# Patient Record
Sex: FEMALE | Race: WHITE | NOT HISPANIC OR LATINO | Employment: OTHER | URBAN - METROPOLITAN AREA
[De-identification: names, ages, dates, MRNs, and addresses within clinical notes are randomized per-mention and may not be internally consistent; named-entity substitution may affect disease eponyms.]

---

## 2018-06-08 ENCOUNTER — HOSPITAL ENCOUNTER (EMERGENCY)
Facility: CLINIC | Age: 38
Discharge: HOME | End: 2018-06-08
Attending: EMERGENCY MEDICINE | Admitting: EMERGENCY MEDICINE
Payer: COMMERCIAL

## 2018-06-08 ENCOUNTER — APPOINTMENT (OUTPATIENT)
Dept: GENERAL RADIOLOGY | Facility: CLINIC | Age: 38
End: 2018-06-08
Attending: EMERGENCY MEDICINE
Payer: COMMERCIAL

## 2018-06-08 VITALS
DIASTOLIC BLOOD PRESSURE: 87 MMHG | HEIGHT: 66 IN | BODY MASS INDEX: 23.78 KG/M2 | RESPIRATION RATE: 18 BRPM | SYSTOLIC BLOOD PRESSURE: 109 MMHG | TEMPERATURE: 98.3 F | OXYGEN SATURATION: 97 % | WEIGHT: 148 LBS

## 2018-06-08 DIAGNOSIS — M79.672 LEFT FOOT PAIN: ICD-10-CM

## 2018-06-08 PROCEDURE — 99283 EMERGENCY DEPT VISIT LOW MDM: CPT

## 2018-06-08 PROCEDURE — 73630 X-RAY EXAM OF FOOT: CPT | Mod: LT

## 2018-06-08 ASSESSMENT — ENCOUNTER SYMPTOMS: NUMBNESS: 0

## 2018-06-08 NOTE — ED AVS SNAPSHOT
[facility] Emergency Department    [address]    [facility] [address]    Phone:  [phone]    Fax:  [phone]                                       Soledad Botello   MRN: [id number]    Department:  [facility] Emergency Department   Date of Visit:  6/8/2018           After Visit Summary Signature Page     I have received my discharge instructions, and my questions have been answered. I have discussed any challenges I see with this plan with the nurse or doctor.    ..........................................................................................................................................  Patient/Patient Representative Signature      ..........................................................................................................................................  Patient Representative Print Name and Relationship to Patient    ..................................................               ................................................  Date                                            Time    ..........................................................................................................................................  Reviewed by Signature/Title    ...................................................              ..............................................  Date                                                            Time

## 2018-06-08 NOTE — ED AVS SNAPSHOT
[facility] Emergency Department    [address]    [facility] [address]    Phone:  [phone]    Fax:  [phone]                                       Soledad Botello   MRN: [id number]    Department:  [facility] Emergency Department   Date of Visit:  6/8/2018           Patient Information     Date Of Birth          1980        Your diagnoses for this visit were:     Left foot pain        You were seen by Ailin Ramirez MD.      Follow-up Information     Follow up with [facility] Emergency Department.    Specialty:  EMERGENCY MEDICINE    Why:  If symptoms worsen    Contact information:    [address]  [facility] [address] [phone]        Discharge Instructions       Ice the area and elevate the leg  Use tylenol and ibuprofen for pain    Discharge Instructions  Extremity Injury    You were seen today for an injury to an extremity (arm, hand, leg, or foot). You may have a bruise, strain, or fracture (broken bone).    Generally, every Emergency Department visit should have a follow-up clinic visit with either a primary or a specialty clinic/provider. Please follow-up as instructed by your emergency provider today.  Return to the Emergency Department right away if:    Your pain seems to change or get worse or there is pain in a new area that wasn t evaluated today.    Your extremity becomes pale, cool, blue, or numb or tingling past the injury.    You have more drainage, redness or pain in the area of the cut or abrasion.    You have pain that you cannot control with the medicine recommended or prescribed here, or you have pain that seems too much for your injury.    Your child (who is injured) will not stop crying or is much more fussy than normal.    You have new symptoms or anything that worries you.    What to Expect:    Your swelling and pain may be worse the day after your injury, but should not be severe and should  start getting better after that. You should not have new symptoms and your pain should not get worse.    You may start to get a bruise over the injured area or below the injured area (bruising can follow gravity).    Your movement and strength should get better with time.    Some injuries may not show up until after you have left the Emergency Department so it is important to follow-up as directed.    Your injury may prevent you from working.  Follow-up with your regular provider to get a work release note.    Pain medications or your injury may make it unsafe to drive or operate machinery.    Home Care:    RICE: Rest, Ice, Compression, Elevation  o Rest: Rest your injured area for at least 1-2 days. After that you may start using your extremity again as long as there is not too much pain.   o Ice: Apply ice your injured area for 15 minutes at a time, at least 3 times a day. Use a cloth between the ice bag and your skin to prevent frostbite. Do not sleep with an ice pack or heating pad on, since this can cause burns or skin injury.  o Compression: You may use an elastic bandage (Ace  Wrap) if it makes you more comfortable. Wrap it just tight enough to provide light compression, like a new pair of socks feels. Loosen the bandage if you have swelling past the bandage.  o Elevation: Raise the injured area above the level of your heart as much as possible in the first 1-2 days.      Use Tylenol  (acetaminophen), Motrin (ibuprofen), or Advil  (ibuprofen) for your pain unless you have an allergy or are told not to use these medications by your provider.  Take the medications as instructed on the package. Tylenol  (acetaminophen) is in many prescription medicines and non-prescription medicines--check all of your medicines to be sure you aren t taking more than 3000 mg per day.    Please follow any other instructions that were discussed with you by your provider.    Stretching/Exercises:  You may have been provided with  instructions for stretching or exercises. If your injury was to your arm or shoulder and your provider put you in a sling or an immobilizer, it is important that you take off your immobilizer within 3 days and stretch/move your shoulder, unless your provider specifically tells you to not move your shoulder.  This is to prevent further injury such as a  frozen shoulder .     If you were given a prescription for medicine here today, be sure to read all of the information (including the package insert) that comes with your prescription.  This will include important information about the medicine, its side effects, and any warnings that you need to know about.  The pharmacist who fills the prescription can provide more information and answer questions you may have about the medicine.  If you have questions or concerns that the pharmacist cannot address, please call or return to the Emergency Department.     Remember that you can always come back to the Emergency Department if you are not able to see your regular provider in the amount of time listed above, if you get any new symptoms, or if there is anything that worries you.      24 Hour Appointment Hotline       To make an appointment at any [facility], call [phone] ([phone]). If you don't have a family doctor or clinic, we will help you find one. [facility] are conveniently located to serve the needs of you and your family.             Review of your medicines      Our records show that you are taking the medicines listed below. If these are incorrect, please call your family doctor or clinic.        Dose / Directions Last dose taken    ADVIL PO        Refills:  0                Procedures and tests performed during your visit     Foot XR, G/E 3 views, left      Orders Needing Specimen Collection     None      Pending Results     Date and Time Order Name Status Description    6/8/2018 5507 Foot XR, G/E 3 views, left Preliminary              Pending Culture Results     No orders found from 6/6/2018 to 6/9/2018.            Pending Results Instructions     If you had any lab results that were not finalized at the time of your Discharge, you can call the ED Lab Result RN at [phone]. You will be contacted by this team for any positive Lab results or changes in treatment. The nurses are available 7 days a week from 10A to 6:30P.  You can leave a message 24 hours per day and they will return your call.        Test Results From Your Hospital Stay        6/8/2018 11:03 PM      Narrative     LEFT FOOT THREE OR MORE VIEWS   6/8/2018 10:58 PM     HISTORY: Left foot pain at medial side near calcaneous.    COMPARISON: None.        Impression     IMPRESSION: Normal.                Clinical Quality Measure: Blood Pressure Screening     Your blood pressure was checked while you were in the emergency department today. The last reading we obtained was  BP: 108/79 . Please read the guidelines below about what these numbers mean and what you should do about them.  If your systolic blood pressure (the top number) is less than 120 and your diastolic blood pressure (the bottom number) is less than 80, then your blood pressure is normal. There is nothing more that you need to do about it.  If your systolic blood pressure (the top number) is 120-139 or your diastolic blood pressure (the bottom number) is 80-89, your blood pressure may be higher than it should be. You should have your blood pressure rechecked within a year by a primary care provider.  If your systolic blood pressure (the top number) is 140 or greater or your diastolic blood pressure (the bottom number) is 90 or greater, you may have high blood pressure. High blood pressure is treatable, but if left untreated over time it can put you at risk for heart attack, stroke, or kidney failure. You should have your blood pressure rechecked by a primary care provider within the next 4 weeks.  If your provider in the  "emergency department today gave you specific instructions to follow-up with your doctor or provider even sooner than that, you should follow that instruction and not wait for up to 4 weeks for your follow-up visit.        Thank you for choosing [facility]       Thank you for choosing [facility] for your care. Our goal is always to provide you with excellent care. Hearing back from our patients is one way we can continue to improve our services. Please take a few minutes to complete the written survey that you may receive in the mail after you visit with us. Thank you!        OOgaveharmakerSQR Information     Countdown lets you send messages to your doctor, view your test results, renew your prescriptions, schedule appointments and more. To sign up, go to [email or web address] . Click on \"Log in\" on the left side of the screen, which will take you to the Welcome page. Then click on \"Sign up Now\" on the right side of the page.     You will be asked to enter the access code listed below, as well as some personal information. Please follow the directions to create your username and password.     Your access code is: [email or web address]  Expires: 2018 11:15 PM     Your access code will  in 90 days. If you need help or a new code, please call your [facility] clinic or [phone].        Care EveryWhere ID     This is your Care EveryWhere ID. This could be used by other organizations to access your [facility] medical records  [id number]        Equal Access to Services     MARIA ELENA JUAN : Hadii tish del rioo Soadrian, waaxda luqadaha, qaybta kaalmada maggieyada, jackie enrique . So [facility] [phone].    ATENCIÓN: Si habla español, tiene a regalado disposición servicios gratuitos de asistencia lingüística. Jayna al [phone].    We comply with applicable federal civil rights laws and [place] laws. We do not discriminate on the basis of race, color, national origin, age, disability, sex, sexual orientation, or " gender identity.            After Visit Summary       This is your record. Keep this with you and show to your community pharmacist(s) and doctor(s) at your next visit.

## 2018-06-09 NOTE — DISCHARGE INSTRUCTIONS
Ice the area and elevate the leg  Use tylenol and ibuprofen for pain    Discharge Instructions  Extremity Injury    You were seen today for an injury to an extremity (arm, hand, leg, or foot). You may have a bruise, strain, or fracture (broken bone).    Generally, every Emergency Department visit should have a follow-up clinic visit with either a primary or a specialty clinic/provider. Please follow-up as instructed by your emergency provider today.  Return to the Emergency Department right away if:    Your pain seems to change or get worse or there is pain in a new area that wasn t evaluated today.    Your extremity becomes pale, cool, blue, or numb or tingling past the injury.    You have more drainage, redness or pain in the area of the cut or abrasion.    You have pain that you cannot control with the medicine recommended or prescribed here, or you have pain that seems too much for your injury.    Your child (who is injured) will not stop crying or is much more fussy than normal.    You have new symptoms or anything that worries you.    What to Expect:    Your swelling and pain may be worse the day after your injury, but should not be severe and should start getting better after that. You should not have new symptoms and your pain should not get worse.    You may start to get a bruise over the injured area or below the injured area (bruising can follow gravity).    Your movement and strength should get better with time.    Some injuries may not show up until after you have left the Emergency Department so it is important to follow-up as directed.    Your injury may prevent you from working.  Follow-up with your regular provider to get a work release note.    Pain medications or your injury may make it unsafe to drive or operate machinery.    Home Care:    RICE: Rest, Ice, Compression, Elevation  o Rest: Rest your injured area for at least 1-2 days. After that you may start using your extremity again as long as  there is not too much pain.   o Ice: Apply ice your injured area for 15 minutes at a time, at least 3 times a day. Use a cloth between the ice bag and your skin to prevent frostbite. Do not sleep with an ice pack or heating pad on, since this can cause burns or skin injury.  o Compression: You may use an elastic bandage (Ace  Wrap) if it makes you more comfortable. Wrap it just tight enough to provide light compression, like a new pair of socks feels. Loosen the bandage if you have swelling past the bandage.  o Elevation: Raise the injured area above the level of your heart as much as possible in the first 1-2 days.      Use Tylenol  (acetaminophen), Motrin (ibuprofen), or Advil  (ibuprofen) for your pain unless you have an allergy or are told not to use these medications by your provider.  Take the medications as instructed on the package. Tylenol  (acetaminophen) is in many prescription medicines and non-prescription medicines--check all of your medicines to be sure you aren t taking more than 3000 mg per day.    Please follow any other instructions that were discussed with you by your provider.    Stretching/Exercises:  You may have been provided with instructions for stretching or exercises. If your injury was to your arm or shoulder and your provider put you in a sling or an immobilizer, it is important that you take off your immobilizer within 3 days and stretch/move your shoulder, unless your provider specifically tells you to not move your shoulder.  This is to prevent further injury such as a  frozen shoulder .     If you were given a prescription for medicine here today, be sure to read all of the information (including the package insert) that comes with your prescription.  This will include important information about the medicine, its side effects, and any warnings that you need to know about.  The pharmacist who fills the prescription can provide more information and answer questions you may have about  the medicine.  If you have questions or concerns that the pharmacist cannot address, please call or return to the Emergency Department.     Remember that you can always come back to the Emergency Department if you are not able to see your regular provider in the amount of time listed above, if you get any new symptoms, or if there is anything that worries you.

## 2018-06-09 NOTE — ED PROVIDER NOTES
"  History     Chief Complaint:  Foot Pain    HPI   Soledad Botello is a 37 year old female who presents to the Emergency Department for evaluation of foot pain. The patient is here with left medial ankle pain after being struck by a ball during a game of softball. The patient did not take anything for pain prior to arrival. Upon presentation she complains of mild pain, swelling and bruising to her medial ankle as well as difficulty with weight bearing. She denies any numbness, tingling, left hip or knee pain. Patient was drinking tonight but she does not complain of any other associated injuries. She was not hit in the head and there was no loss of consciousness.    Allergies:  No known drug allergies    Medications:    Ibuprofen    Past Medical History:    The patient denies any significant past medical history.    Past Surgical History:    The patient does not have any pertinent past surgical history.    Family History:    No past pertinent family history.    Social History:  The patient was accompanied to the ED by .  Marital Status:   [2]     Review of Systems   Musculoskeletal:        + Left ankle pain   Neurological: Negative for numbness.   All other systems reviewed and are negative.    Physical Exam   First Vitals:  Height: 167.6 cm (5' 6\")  Weight: 67.1 kg (148 lb)    Physical Exam  General: Resting comfortably on the gurney  Eyes:  The pupils are equal and round    Conjunctivae and sclerae are normal  ENT:    Moist mucous membranes  Neck:  Normal range of motion  CV:  Regular rate and rhythm    Skin warm and well perfused     DP/PT pulses 2+ bilaterally  Resp:  Non labored breathing on room air   GI:  Abdomen is soft, there is no rigidity    No distension    No rebound tenderness     No abdominal tenderness  MS:  Normal muscular tone    Mild swelling on left medial foot with mild tenderness over this area  Skin:  No rash or acute skin lesions noted  Neuro:   Awake, alert.      Speech is " normal and fluent.    Face is symmetric.     Moves all extremities equally     SILT on left foot  Psych: Normal affect.  Appropriate interactions.      Emergency Department Course     Imaging:  Radiographic findings were communicated with the patient who voiced understanding of the findings.    Left foot XR:  Normal. As per radiology.    Emergency Department Course:  Nursing notes and vitals reviewed. I performed an exam of the patient as documented above.     The patient was sent for a left foot xray while here in the emergency department, findings above.    2300 I reassessed the patient.     Findings and plan explained to the Patient. Patient discharged home with instructions regarding supportive care, medications, and reasons to return. The importance of close follow-up was reviewed.    Impression & Plan      Medical Decision Making:  Soledad Botello is a 37 year old female who presents with left ankle pain. history and exam today is consistent with contusion.   X-ray was obtained and showed no evidence of fracture, dislocation or disruption of the ankle mortis.  There is no hip or knee pain to suggest proximal injury. The patient was provided with an ace wrap. They were instructed to ice, elevate, use the ace wrap for elevation and support.  They may weight bear as tolerated and gradually return to activities as tolerated.  I recommended primary care follow up in 1-2 weeks for failure to improve.     Diagnosis:    ICD-10-CM    1. Left foot pain M79.672        Disposition:  discharged to home      IAngelica, am serving as a scribe on 6/8/2018 at 10:32 PM to personally document services performed by Ailin Ramirez MD based on my observations and the provider's statements to me.     Angelica De Santiago  6/8/2018   [facility] EMERGENCY DEPARTMENT       Ailin Ramirez MD  06/08/18 3494

## 2018-06-09 NOTE — ED TRIAGE NOTES
Line drive hit ankle while playing coed softball. Mild swelling to left ankle.  Two aleve taken prior to arrival. Able to move foot/toes.  No numbness or tingling.  ABCD's intact; A/o x 4.

## 2018-10-17 ENCOUNTER — HEALTH MAINTENANCE LETTER (OUTPATIENT)
Age: 38
End: 2018-10-17

## 2020-03-11 ENCOUNTER — HEALTH MAINTENANCE LETTER (OUTPATIENT)
Age: 40
End: 2020-03-11

## 2020-08-05 ENCOUNTER — OFFICE VISIT - HEALTHEAST (OUTPATIENT)
Dept: FAMILY MEDICINE | Facility: CLINIC | Age: 40
End: 2020-08-05

## 2020-08-05 DIAGNOSIS — H60.332 ACUTE SWIMMER'S EAR OF LEFT SIDE: ICD-10-CM

## 2020-08-06 ENCOUNTER — OFFICE VISIT (OUTPATIENT)
Dept: URGENT CARE | Facility: URGENT CARE | Age: 40
End: 2020-08-06
Payer: COMMERCIAL

## 2020-08-06 VITALS
WEIGHT: 138 LBS | TEMPERATURE: 99.4 F | HEART RATE: 88 BPM | OXYGEN SATURATION: 100 % | SYSTOLIC BLOOD PRESSURE: 126 MMHG | DIASTOLIC BLOOD PRESSURE: 70 MMHG | BODY MASS INDEX: 21.66 KG/M2 | HEIGHT: 67 IN

## 2020-08-06 DIAGNOSIS — H60.502 ACUTE OTITIS EXTERNA OF LEFT EAR, UNSPECIFIED TYPE: ICD-10-CM

## 2020-08-06 DIAGNOSIS — H65.92 OME (OTITIS MEDIA WITH EFFUSION), LEFT: Primary | ICD-10-CM

## 2020-08-06 PROCEDURE — 99203 OFFICE O/P NEW LOW 30 MIN: CPT | Performed by: NURSE PRACTITIONER

## 2020-08-06 RX ORDER — PREDNISONE 20 MG/1
40 TABLET ORAL DAILY
Qty: 10 TABLET | Refills: 0 | Status: SHIPPED | OUTPATIENT
Start: 2020-08-06 | End: 2020-08-11

## 2020-08-06 RX ORDER — AMOXICILLIN 875 MG
875 TABLET ORAL 2 TIMES DAILY
Qty: 20 TABLET | Refills: 0 | Status: SHIPPED | OUTPATIENT
Start: 2020-08-06 | End: 2020-08-16

## 2020-08-06 RX ORDER — PSEUDOEPHEDRINE HCL 30 MG
TABLET ORAL EVERY 4 HOURS PRN
COMMUNITY
End: 2020-10-24

## 2020-08-06 ASSESSMENT — ENCOUNTER SYMPTOMS
LIGHT-HEADEDNESS: 1
VOMITING: 0
RHINORRHEA: 0
HEADACHES: 0
DIARRHEA: 0
COUGH: 0
NAUSEA: 0
SORE THROAT: 1
DIZZINESS: 0
FEVER: 0

## 2020-08-06 ASSESSMENT — MIFFLIN-ST. JEOR: SCORE: 1343.59

## 2020-08-07 NOTE — PATIENT INSTRUCTIONS
Amoxicillin twice a day for 10 days  Prednisone daily for 5 days (take in the morning with food)  Continue with ear drops as prescribed.   Tylenol and ibuprofen as needed for pain  Continue with sudafed as needed  Can do ice to help with pain and swelling.

## 2020-08-07 NOTE — PROGRESS NOTES
"SUBJECTIVE:   Soledad Botello is a 39 year old female presenting with a chief complaint of   Chief Complaint   Patient presents with     Urgent Care     Pt in clinic to have eval for left ear pain.     Otalgia       She is a new patient of [facility].    Ear Pain    Onset of symptoms was 4 day(s) ago.  Course of illness is worsening.    Severity moderate  Current and Associated symptoms: see ROS  Treatment measures tried include sudafed.  Predisposing factors; Denies sick contacts. Has been swimming.         Review of Systems   Constitutional: Negative for fever.   HENT: Positive for ear discharge, ear pain (Left) and sore throat. Negative for congestion and rhinorrhea.    Respiratory: Negative for cough.    Gastrointestinal: Negative for diarrhea, nausea and vomiting.   Skin: Negative for rash.   Neurological: Positive for light-headedness. Negative for dizziness and headaches.       Past Medical History:   Diagnosis Date     ADD (attention deficit disorder)      No family history on file.  Current Outpatient Medications   Medication Sig Dispense Refill     amoxicillin (AMOXIL) 875 MG tablet Take 1 tablet (875 mg) by mouth 2 times daily for 10 days 20 tablet 0     Amphetamine-Dextroamphetamine (ADDERALL PO)        Ibuprofen (ADVIL PO)        predniSONE (DELTASONE) 20 MG tablet Take 2 tablets (40 mg) by mouth daily for 5 days 10 tablet 0     pseudoePHEDrine (SUDAFED) 30 MG tablet Take by mouth every 4 hours as needed for congestion       VITAMIN D PO        Social History     Tobacco Use     Smoking status: Never Smoker     Smokeless tobacco: Never Used   Substance Use Topics     Alcohol use: Yes     Alcohol/week: 8.0 standard drinks     Types: 4 Glasses of wine, 4 Cans of beer per week       OBJECTIVE  /70   Pulse 88   Temp 99.4  F (37.4  C) (Oral)   Ht 1.702 m (5' 7\")   Wt 62.6 kg (138 lb)   SpO2 100%   BMI 21.61 kg/m      Physical Exam  Vitals signs and nursing note reviewed.   Constitutional:       " Pt is cooperative with care, medication compliant and present in the milieu  Pt brightens upon approach, is present in the milieu social with peers  Pt requested PRN atarax for anxiety  Will monitor  Appearance: Normal appearance. She is well-developed and well-groomed.   HENT:      Head: Normocephalic and atraumatic.      Right Ear: Tympanic membrane, ear canal and external ear normal.      Left Ear: External ear normal.      Ears:      Comments: Left ear canal is swollen and erythematous.      Nose: Nose normal.   Eyes:      Extraocular Movements: Extraocular movements intact.      Conjunctiva/sclera: Conjunctivae normal.      Pupils: Pupils are equal, round, and reactive to light.   Neck:      Musculoskeletal: Normal range of motion and neck supple.   Cardiovascular:      Rate and Rhythm: Normal rate and regular rhythm.      Heart sounds: Normal heart sounds.   Pulmonary:      Effort: Pulmonary effort is normal.      Breath sounds: Normal breath sounds and air entry.   Lymphadenopathy:      Cervical: Cervical adenopathy present.   Skin:     General: Skin is warm and dry.   Neurological:      Mental Status: She is alert and oriented to person, place, and time.      GCS: GCS eye subscore is 4. GCS verbal subscore is 5. GCS motor subscore is 6.   Psychiatric:         Behavior: Behavior is cooperative.         ASSESSMENT:      ICD-10-CM    1. OME (otitis media with effusion), left  H65.92 amoxicillin (AMOXIL) 875 MG tablet   2. Acute otitis externa of left ear, unspecified type  H60.502 predniSONE (DELTASONE) 20 MG tablet        Medical Decision Making:    Differential Diagnosis:  Ear Pain:  Acute left otitis media, Allergic rhinitis, Otitis externa, TMJ    Serious Comorbid Conditions:  Adult:  None    PLAN:    Discussed with patient that ear canal is pretty swollen. Will start her on amoxicillin twice a day for 10 days. Continue with the cortisporin drops as previously prescribed. Prednisone daily for 5 days. Additional symptomatic treatment recommendations discussed. Education was added to AVS. Patient was agreeable to plan and verbalized understanding.       Followup:    If not improving in 3 days or if  condition worsens, follow up with your Primary Care Provider    Patient Instructions   Amoxicillin twice a day for 10 days  Prednisone daily for 5 days (take in the morning with food)  Continue with ear drops as prescribed.   Tylenol and ibuprofen as needed for pain  Continue with sudafed as needed  Can do ice to help with pain and swelling.

## 2020-10-24 ENCOUNTER — APPOINTMENT (OUTPATIENT)
Dept: CT IMAGING | Facility: CLINIC | Age: 40
End: 2020-10-24
Attending: EMERGENCY MEDICINE
Payer: COMMERCIAL

## 2020-10-24 ENCOUNTER — APPOINTMENT (OUTPATIENT)
Dept: GENERAL RADIOLOGY | Facility: CLINIC | Age: 40
End: 2020-10-24
Attending: EMERGENCY MEDICINE
Payer: COMMERCIAL

## 2020-10-24 ENCOUNTER — HOSPITAL ENCOUNTER (INPATIENT)
Facility: CLINIC | Age: 40
LOS: 1 days | Discharge: HOME | End: 2020-10-25
Attending: EMERGENCY MEDICINE | Admitting: ANESTHESIOLOGY
Payer: COMMERCIAL

## 2020-10-24 ENCOUNTER — APPOINTMENT (OUTPATIENT)
Dept: ULTRASOUND IMAGING | Facility: CLINIC | Age: 40
End: 2020-10-24
Payer: COMMERCIAL

## 2020-10-24 DIAGNOSIS — D62 ANEMIA DUE TO BLOOD LOSS, ACUTE: ICD-10-CM

## 2020-10-24 DIAGNOSIS — Z20.828 EXPOSURE TO SARS-ASSOCIATED CORONAVIRUS: ICD-10-CM

## 2020-10-24 DIAGNOSIS — N98.1 OVARIAN HYPERSTIMULATION SYNDROME: ICD-10-CM

## 2020-10-24 LAB
ABO + RH BLD: NORMAL
ABO + RH BLD: NORMAL
ALBUMIN SERPL-MCNC: 3.5 G/DL (ref 3.4–5)
ALBUMIN UR-MCNC: 10 MG/DL
ALP SERPL-CCNC: 45 U/L (ref 40–150)
ALT SERPL W P-5'-P-CCNC: 47 U/L (ref 0–50)
ANION GAP SERPL CALCULATED.3IONS-SCNC: 4 MMOL/L (ref 3–14)
ANION GAP SERPL CALCULATED.3IONS-SCNC: 6 MMOL/L (ref 3–14)
ANION GAP SERPL CALCULATED.3IONS-SCNC: 6 MMOL/L (ref 3–14)
APPEARANCE UR: CLEAR
APTT PPP: 29 SEC (ref 22–37)
AST SERPL W P-5'-P-CCNC: 24 U/L (ref 0–45)
BASOPHILS # BLD AUTO: 0 10E9/L (ref 0–0.2)
BASOPHILS NFR BLD AUTO: 0.1 %
BASOPHILS NFR BLD AUTO: 0.2 %
BASOPHILS NFR BLD AUTO: 0.2 %
BILIRUB DIRECT SERPL-MCNC: <0.1 MG/DL (ref 0–0.2)
BILIRUB SERPL-MCNC: 0.2 MG/DL (ref 0.2–1.3)
BILIRUB UR QL STRIP: NEGATIVE
BLD GP AB SCN SERPL QL: NORMAL
BLD PROD TYP BPU: NORMAL
BLD PROD TYP BPU: NORMAL
BLD UNIT ID BPU: 0
BLOOD BANK CMNT PATIENT-IMP: NORMAL
BLOOD PRODUCT CODE: NORMAL
BPU ID: NORMAL
BUN SERPL-MCNC: 16 MG/DL (ref 7–30)
BUN SERPL-MCNC: 6 MG/DL (ref 7–30)
BUN SERPL-MCNC: 8 MG/DL (ref 7–30)
CA-I BLD-MCNC: 4.3 MG/DL (ref 4.4–5.2)
CALCIUM SERPL-MCNC: 7.5 MG/DL (ref 8.5–10.1)
CALCIUM SERPL-MCNC: 8.4 MG/DL (ref 8.5–10.1)
CALCIUM SERPL-MCNC: 8.4 MG/DL (ref 8.5–10.1)
CHLORIDE SERPL-SCNC: 106 MMOL/L (ref 94–109)
CHLORIDE SERPL-SCNC: 109 MMOL/L (ref 94–109)
CHLORIDE SERPL-SCNC: 110 MMOL/L (ref 94–109)
CO2 SERPL-SCNC: 25 MMOL/L (ref 20–32)
CO2 SERPL-SCNC: 26 MMOL/L (ref 20–32)
CO2 SERPL-SCNC: 26 MMOL/L (ref 20–32)
COLOR UR AUTO: YELLOW
CREAT SERPL-MCNC: 0.75 MG/DL (ref 0.52–1.04)
CREAT SERPL-MCNC: 0.78 MG/DL (ref 0.52–1.04)
CREAT SERPL-MCNC: 0.85 MG/DL (ref 0.52–1.04)
DIFFERENTIAL METHOD BLD: ABNORMAL
EOSINOPHIL # BLD AUTO: 0 10E9/L (ref 0–0.7)
EOSINOPHIL # BLD AUTO: 0 10E9/L (ref 0–0.7)
EOSINOPHIL # BLD AUTO: 0.1 10E9/L (ref 0–0.7)
EOSINOPHIL NFR BLD AUTO: 0.1 %
EOSINOPHIL NFR BLD AUTO: 0.2 %
EOSINOPHIL NFR BLD AUTO: 0.4 %
ERYTHROCYTE [DISTWIDTH] IN BLOOD BY AUTOMATED COUNT: 11.9 % (ref 10–15)
ERYTHROCYTE [DISTWIDTH] IN BLOOD BY AUTOMATED COUNT: 11.9 % (ref 10–15)
ERYTHROCYTE [DISTWIDTH] IN BLOOD BY AUTOMATED COUNT: 12.1 % (ref 10–15)
ERYTHROCYTE [DISTWIDTH] IN BLOOD BY AUTOMATED COUNT: 12.7 % (ref 10–15)
GFR SERPL CREATININE-BSD FRML MDRD: 87 ML/MIN/{1.73_M2}
GFR SERPL CREATININE-BSD FRML MDRD: >90 ML/MIN/{1.73_M2}
GFR SERPL CREATININE-BSD FRML MDRD: >90 ML/MIN/{1.73_M2}
GLUCOSE SERPL-MCNC: 101 MG/DL (ref 70–99)
GLUCOSE SERPL-MCNC: 108 MG/DL (ref 70–99)
GLUCOSE SERPL-MCNC: 123 MG/DL (ref 70–99)
GLUCOSE UR STRIP-MCNC: NEGATIVE MG/DL
HCG UR QL: NEGATIVE
HCT VFR BLD AUTO: 22.6 % (ref 35–47)
HCT VFR BLD AUTO: 24.8 % (ref 35–47)
HCT VFR BLD AUTO: 25.9 % (ref 35–47)
HCT VFR BLD AUTO: 31.4 % (ref 35–47)
HGB BLD-MCNC: 10.3 G/DL (ref 11.7–15.7)
HGB BLD-MCNC: 7.2 G/DL (ref 11.7–15.7)
HGB BLD-MCNC: 8.3 G/DL (ref 11.7–15.7)
HGB BLD-MCNC: 8.5 G/DL (ref 11.7–15.7)
HGB BLD-MCNC: 8.8 G/DL (ref 11.7–15.7)
HGB UR QL STRIP: ABNORMAL
IMM GRANULOCYTES # BLD: 0 10E9/L (ref 0–0.4)
IMM GRANULOCYTES # BLD: 0.1 10E9/L (ref 0–0.4)
IMM GRANULOCYTES # BLD: 0.1 10E9/L (ref 0–0.4)
IMM GRANULOCYTES NFR BLD: 0.4 %
IMM GRANULOCYTES NFR BLD: 0.5 %
IMM GRANULOCYTES NFR BLD: 0.7 %
INR PPP: 1.03 (ref 0.86–1.14)
INTERNAL QC OK POCT: YES
INTERPRETATION ECG - MUSE: NORMAL
KETONES UR STRIP-MCNC: NEGATIVE MG/DL
LACTATE BLD-SCNC: 0.6 MMOL/L (ref 0.7–2)
LACTATE BLD-SCNC: 0.7 MMOL/L (ref 0.7–2)
LEUKOCYTE ESTERASE UR QL STRIP: NEGATIVE
LYMPHOCYTES # BLD AUTO: 1.4 10E9/L (ref 0.8–5.3)
LYMPHOCYTES # BLD AUTO: 1.4 10E9/L (ref 0.8–5.3)
LYMPHOCYTES # BLD AUTO: 2 10E9/L (ref 0.8–5.3)
LYMPHOCYTES NFR BLD AUTO: 10.3 %
LYMPHOCYTES NFR BLD AUTO: 10.8 %
LYMPHOCYTES NFR BLD AUTO: 13.7 %
MCH RBC QN AUTO: 31.6 PG (ref 26.5–33)
MCH RBC QN AUTO: 31.8 PG (ref 26.5–33)
MCH RBC QN AUTO: 32 PG (ref 26.5–33)
MCH RBC QN AUTO: 32.8 PG (ref 26.5–33)
MCHC RBC AUTO-ENTMCNC: 32.6 G/DL (ref 31.5–36.5)
MCHC RBC AUTO-ENTMCNC: 32.8 G/DL (ref 31.5–36.5)
MCHC RBC AUTO-ENTMCNC: 32.8 G/DL (ref 31.5–36.5)
MCHC RBC AUTO-ENTMCNC: 33.5 G/DL (ref 31.5–36.5)
MCV RBC AUTO: 100 FL (ref 78–100)
MCV RBC AUTO: 96 FL (ref 78–100)
MCV RBC AUTO: 97 FL (ref 78–100)
MCV RBC AUTO: 98 FL (ref 78–100)
MONOCYTES # BLD AUTO: 0.6 10E9/L (ref 0–1.3)
MONOCYTES # BLD AUTO: 0.7 10E9/L (ref 0–1.3)
MONOCYTES # BLD AUTO: 1.4 10E9/L (ref 0–1.3)
MONOCYTES NFR BLD AUTO: 5.5 %
MONOCYTES NFR BLD AUTO: 6.1 %
MONOCYTES NFR BLD AUTO: 7.8 %
NEUTROPHILS # BLD AUTO: 10.9 10E9/L (ref 1.6–8.3)
NEUTROPHILS # BLD AUTO: 14.6 10E9/L (ref 1.6–8.3)
NEUTROPHILS # BLD AUTO: 8 10E9/L (ref 1.6–8.3)
NEUTROPHILS NFR BLD AUTO: 79.5 %
NEUTROPHILS NFR BLD AUTO: 80.1 %
NEUTROPHILS NFR BLD AUTO: 83.4 %
NITRATE UR QL: NEGATIVE
NRBC # BLD AUTO: 0 10*3/UL
NRBC BLD AUTO-RTO: 0 /100
NUM BPU REQUESTED: 1
PH UR STRIP: 5.5 PH (ref 5–7)
PLATELET # BLD AUTO: 142 10E9/L (ref 150–450)
PLATELET # BLD AUTO: 153 10E9/L (ref 150–450)
PLATELET # BLD AUTO: 156 10E9/L (ref 150–450)
PLATELET # BLD AUTO: 229 10E9/L (ref 150–450)
POTASSIUM SERPL-SCNC: 3.6 MMOL/L (ref 3.4–5.3)
POTASSIUM SERPL-SCNC: 3.6 MMOL/L (ref 3.4–5.3)
POTASSIUM SERPL-SCNC: 4 MMOL/L (ref 3.4–5.3)
PROT SERPL-MCNC: 6.3 G/DL (ref 6.8–8.8)
RBC # BLD AUTO: 2.25 10E12/L (ref 3.8–5.2)
RBC # BLD AUTO: 2.53 10E12/L (ref 3.8–5.2)
RBC # BLD AUTO: 2.69 10E12/L (ref 3.8–5.2)
RBC # BLD AUTO: 3.24 10E12/L (ref 3.8–5.2)
RBC #/AREA URNS AUTO: 2 /HPF (ref 0–2)
SARS-COV-2 RNA SPEC QL NAA+PROBE: NOT DETECTED
SODIUM SERPL-SCNC: 138 MMOL/L (ref 133–144)
SODIUM SERPL-SCNC: 140 MMOL/L (ref 133–144)
SODIUM SERPL-SCNC: 140 MMOL/L (ref 133–144)
SOURCE: ABNORMAL
SP GR UR STRIP: 1.03 (ref 1–1.03)
SPECIMEN EXP DATE BLD: NORMAL
SPECIMEN SOURCE: NORMAL
TRANSFUSION STATUS PATIENT QL: NORMAL
TRANSFUSION STATUS PATIENT QL: NORMAL
UROBILINOGEN UR STRIP-MCNC: NORMAL MG/DL (ref 0–2)
WBC # BLD AUTO: 10.1 10E9/L (ref 4–11)
WBC # BLD AUTO: 11.4 10E9/L (ref 4–11)
WBC # BLD AUTO: 13.1 10E9/L (ref 4–11)
WBC # BLD AUTO: 18.2 10E9/L (ref 4–11)
WBC #/AREA URNS AUTO: 0 /HPF (ref 0–5)

## 2020-10-24 PROCEDURE — 83605 ASSAY OF LACTIC ACID: CPT | Performed by: NURSE PRACTITIONER

## 2020-10-24 PROCEDURE — 80048 BASIC METABOLIC PNL TOTAL CA: CPT | Performed by: NURSE PRACTITIONER

## 2020-10-24 PROCEDURE — 71045 X-RAY EXAM CHEST 1 VIEW: CPT

## 2020-10-24 PROCEDURE — 83605 ASSAY OF LACTIC ACID: CPT | Performed by: EMERGENCY MEDICINE

## 2020-10-24 PROCEDURE — 250N000013 HC RX MED GY IP 250 OP 250 PS 637: Performed by: ANESTHESIOLOGY

## 2020-10-24 PROCEDURE — 74177 CT ABD & PELVIS W/CONTRAST: CPT

## 2020-10-24 PROCEDURE — 96376 TX/PRO/DX INJ SAME DRUG ADON: CPT | Performed by: EMERGENCY MEDICINE

## 2020-10-24 PROCEDURE — 93010 ELECTROCARDIOGRAM REPORT: CPT | Performed by: EMERGENCY MEDICINE

## 2020-10-24 PROCEDURE — 36430 TRANSFUSION BLD/BLD COMPNT: CPT | Performed by: EMERGENCY MEDICINE

## 2020-10-24 PROCEDURE — 250N000013 HC RX MED GY IP 250 OP 250 PS 637: Performed by: NURSE PRACTITIONER

## 2020-10-24 PROCEDURE — 250N000011 HC RX IP 250 OP 636: Performed by: EMERGENCY MEDICINE

## 2020-10-24 PROCEDURE — 86850 RBC ANTIBODY SCREEN: CPT | Performed by: STUDENT IN AN ORGANIZED HEALTH CARE EDUCATION/TRAINING PROGRAM

## 2020-10-24 PROCEDURE — 200N000002 HC R&B ICU UMMC

## 2020-10-24 PROCEDURE — 99291 CRITICAL CARE FIRST HOUR: CPT | Mod: 25 | Performed by: EMERGENCY MEDICINE

## 2020-10-24 PROCEDURE — U0003 INFECTIOUS AGENT DETECTION BY NUCLEIC ACID (DNA OR RNA); SEVERE ACUTE RESPIRATORY SYNDROME CORONAVIRUS 2 (SARS-COV-2) (CORONAVIRUS DISEASE [COVID-19]), AMPLIFIED PROBE TECHNIQUE, MAKING USE OF HIGH THROUGHPUT TECHNOLOGIES AS DESCRIBED BY CMS-2020-01-R: HCPCS | Performed by: EMERGENCY MEDICINE

## 2020-10-24 PROCEDURE — 36415 COLL VENOUS BLD VENIPUNCTURE: CPT | Performed by: STUDENT IN AN ORGANIZED HEALTH CARE EDUCATION/TRAINING PROGRAM

## 2020-10-24 PROCEDURE — 71045 X-RAY EXAM CHEST 1 VIEW: CPT | Mod: 26 | Performed by: RADIOLOGY

## 2020-10-24 PROCEDURE — 85025 COMPLETE CBC W/AUTO DIFF WBC: CPT | Performed by: EMERGENCY MEDICINE

## 2020-10-24 PROCEDURE — 250N000012 HC RX MED GY IP 250 OP 636 PS 637: Performed by: NURSE PRACTITIONER

## 2020-10-24 PROCEDURE — 76705 ECHO EXAM OF ABDOMEN: CPT | Mod: 26

## 2020-10-24 PROCEDURE — 99291 CRITICAL CARE FIRST HOUR: CPT | Performed by: NURSE PRACTITIONER

## 2020-10-24 PROCEDURE — 93005 ELECTROCARDIOGRAM TRACING: CPT | Performed by: EMERGENCY MEDICINE

## 2020-10-24 PROCEDURE — 86900 BLOOD TYPING SEROLOGIC ABO: CPT | Performed by: STUDENT IN AN ORGANIZED HEALTH CARE EDUCATION/TRAINING PROGRAM

## 2020-10-24 PROCEDURE — P9041 ALBUMIN (HUMAN),5%, 50ML: HCPCS | Performed by: EMERGENCY MEDICINE

## 2020-10-24 PROCEDURE — 80053 COMPREHEN METABOLIC PANEL: CPT | Performed by: EMERGENCY MEDICINE

## 2020-10-24 PROCEDURE — 82248 BILIRUBIN DIRECT: CPT | Performed by: EMERGENCY MEDICINE

## 2020-10-24 PROCEDURE — P9016 RBC LEUKOCYTES REDUCED: HCPCS | Performed by: STUDENT IN AN ORGANIZED HEALTH CARE EDUCATION/TRAINING PROGRAM

## 2020-10-24 PROCEDURE — 87040 BLOOD CULTURE FOR BACTERIA: CPT | Performed by: NURSE PRACTITIONER

## 2020-10-24 PROCEDURE — 250N000011 HC RX IP 250 OP 636: Performed by: NURSE PRACTITIONER

## 2020-10-24 PROCEDURE — 258N000003 HC RX IP 258 OP 636: Performed by: EMERGENCY MEDICINE

## 2020-10-24 PROCEDURE — 93308 TTE F-UP OR LMTD: CPT | Performed by: EMERGENCY MEDICINE

## 2020-10-24 PROCEDURE — 250N000013 HC RX MED GY IP 250 OP 250 PS 637: Performed by: EMERGENCY MEDICINE

## 2020-10-24 PROCEDURE — U0003 INFECTIOUS AGENT DETECTION BY NUCLEIC ACID (DNA OR RNA); SEVERE ACUTE RESPIRATORY SYNDROME CORONAVIRUS 2 (SARS-COV-2) (CORONAVIRUS DISEASE [COVID-19]), AMPLIFIED PROBE TECHNIQUE, MAKING USE OF HIGH THROUGHPUT TECHNOLOGIES AS DESCRIBED BY CMS-2020-01-R: HCPCS | Performed by: NURSE PRACTITIONER

## 2020-10-24 PROCEDURE — 76705 ECHO EXAM OF ABDOMEN: CPT

## 2020-10-24 PROCEDURE — 96361 HYDRATE IV INFUSION ADD-ON: CPT | Performed by: EMERGENCY MEDICINE

## 2020-10-24 PROCEDURE — 86901 BLOOD TYPING SEROLOGIC RH(D): CPT | Performed by: STUDENT IN AN ORGANIZED HEALTH CARE EDUCATION/TRAINING PROGRAM

## 2020-10-24 PROCEDURE — 81001 URINALYSIS AUTO W/SCOPE: CPT | Performed by: EMERGENCY MEDICINE

## 2020-10-24 PROCEDURE — 82330 ASSAY OF CALCIUM: CPT | Performed by: NURSE PRACTITIONER

## 2020-10-24 PROCEDURE — 85018 HEMOGLOBIN: CPT | Performed by: STUDENT IN AN ORGANIZED HEALTH CARE EDUCATION/TRAINING PROGRAM

## 2020-10-24 PROCEDURE — 99285 EMERGENCY DEPT VISIT HI MDM: CPT | Mod: 25 | Performed by: EMERGENCY MEDICINE

## 2020-10-24 PROCEDURE — 96375 TX/PRO/DX INJ NEW DRUG ADDON: CPT | Performed by: EMERGENCY MEDICINE

## 2020-10-24 PROCEDURE — 74177 CT ABD & PELVIS W/CONTRAST: CPT | Mod: 26 | Performed by: RADIOLOGY

## 2020-10-24 PROCEDURE — 36415 COLL VENOUS BLD VENIPUNCTURE: CPT | Performed by: NURSE PRACTITIONER

## 2020-10-24 PROCEDURE — 96374 THER/PROPH/DIAG INJ IV PUSH: CPT | Mod: 59 | Performed by: EMERGENCY MEDICINE

## 2020-10-24 PROCEDURE — C9803 HOPD COVID-19 SPEC COLLECT: HCPCS | Performed by: EMERGENCY MEDICINE

## 2020-10-24 PROCEDURE — 86923 COMPATIBILITY TEST ELECTRIC: CPT | Performed by: STUDENT IN AN ORGANIZED HEALTH CARE EDUCATION/TRAINING PROGRAM

## 2020-10-24 PROCEDURE — 85027 COMPLETE CBC AUTOMATED: CPT | Performed by: NURSE PRACTITIONER

## 2020-10-24 PROCEDURE — 258N000003 HC RX IP 258 OP 636: Performed by: NURSE PRACTITIONER

## 2020-10-24 PROCEDURE — 85730 THROMBOPLASTIN TIME PARTIAL: CPT | Performed by: STUDENT IN AN ORGANIZED HEALTH CARE EDUCATION/TRAINING PROGRAM

## 2020-10-24 PROCEDURE — 85610 PROTHROMBIN TIME: CPT | Performed by: STUDENT IN AN ORGANIZED HEALTH CARE EDUCATION/TRAINING PROGRAM

## 2020-10-24 PROCEDURE — 80048 BASIC METABOLIC PNL TOTAL CA: CPT | Performed by: STUDENT IN AN ORGANIZED HEALTH CARE EDUCATION/TRAINING PROGRAM

## 2020-10-24 PROCEDURE — 93308 TTE F-UP OR LMTD: CPT | Mod: 26 | Performed by: EMERGENCY MEDICINE

## 2020-10-24 RX ORDER — POTASSIUM CHLORIDE 750 MG/1
20-40 TABLET, EXTENDED RELEASE ORAL
Status: DISCONTINUED | OUTPATIENT
Start: 2020-10-24 | End: 2020-10-25 | Stop reason: HOSPADM

## 2020-10-24 RX ORDER — POTASSIUM CHLORIDE 7.45 MG/ML
10 INJECTION INTRAVENOUS
Status: DISCONTINUED | OUTPATIENT
Start: 2020-10-24 | End: 2020-10-25 | Stop reason: HOSPADM

## 2020-10-24 RX ORDER — POTASSIUM CHLORIDE 29.8 MG/ML
20 INJECTION INTRAVENOUS
Status: DISCONTINUED | OUTPATIENT
Start: 2020-10-24 | End: 2020-10-25 | Stop reason: HOSPADM

## 2020-10-24 RX ORDER — NALOXONE HYDROCHLORIDE 0.4 MG/ML
.1-.4 INJECTION, SOLUTION INTRAMUSCULAR; INTRAVENOUS; SUBCUTANEOUS
Status: DISCONTINUED | OUTPATIENT
Start: 2020-10-24 | End: 2020-10-25 | Stop reason: HOSPADM

## 2020-10-24 RX ORDER — HYDROMORPHONE HCL IN WATER/PF 6 MG/30 ML
0.2 PATIENT CONTROLLED ANALGESIA SYRINGE INTRAVENOUS
Status: DISCONTINUED | OUTPATIENT
Start: 2020-10-24 | End: 2020-10-25 | Stop reason: HOSPADM

## 2020-10-24 RX ORDER — UBIDECARENONE 100 MG
600 CAPSULE ORAL DAILY
COMMUNITY

## 2020-10-24 RX ORDER — IBUPROFEN 200 MG
200 TABLET ORAL EVERY 6 HOURS PRN
Status: DISCONTINUED | OUTPATIENT
Start: 2020-10-24 | End: 2020-10-24

## 2020-10-24 RX ORDER — DEXTROAMPHETAMINE SACCHARATE, AMPHETAMINE ASPARTATE, DEXTROAMPHETAMINE SULFATE AND AMPHETAMINE SULFATE 2.5; 2.5; 2.5; 2.5 MG/1; MG/1; MG/1; MG/1
10 TABLET ORAL 3 TIMES DAILY
Status: DISCONTINUED | OUTPATIENT
Start: 2020-10-24 | End: 2020-10-25 | Stop reason: HOSPADM

## 2020-10-24 RX ORDER — ACETAMINOPHEN 325 MG/1
975 TABLET ORAL EVERY 6 HOURS
Status: DISCONTINUED | OUTPATIENT
Start: 2020-10-24 | End: 2020-10-25 | Stop reason: HOSPADM

## 2020-10-24 RX ORDER — CABERGOLINE 0.5 MG/1
0.5 TABLET ORAL
Status: DISCONTINUED | OUTPATIENT
Start: 2020-10-24 | End: 2020-10-25

## 2020-10-24 RX ORDER — ALBUMIN, HUMAN INJ 5% 5 %
500 SOLUTION INTRAVENOUS ONCE
Status: COMPLETED | OUTPATIENT
Start: 2020-10-24 | End: 2020-10-24

## 2020-10-24 RX ORDER — LIDOCAINE 4 G/G
1 PATCH TOPICAL
Status: DISCONTINUED | OUTPATIENT
Start: 2020-10-24 | End: 2020-10-25 | Stop reason: HOSPADM

## 2020-10-24 RX ORDER — UBIDECARENONE 50 MG
50 CAPSULE ORAL DAILY
Status: DISCONTINUED | OUTPATIENT
Start: 2020-10-24 | End: 2020-10-24

## 2020-10-24 RX ORDER — MULTIVITAMIN,THERAPEUTIC
1 TABLET ORAL DAILY
Status: DISCONTINUED | OUTPATIENT
Start: 2020-10-24 | End: 2020-10-25 | Stop reason: HOSPADM

## 2020-10-24 RX ORDER — CABERGOLINE 0.5 MG/1
0.5 TABLET ORAL
Status: ON HOLD | COMMUNITY
End: 2020-10-25

## 2020-10-24 RX ORDER — LIDOCAINE 4 G/G
2 PATCH TOPICAL
Status: DISCONTINUED | OUTPATIENT
Start: 2020-10-24 | End: 2020-10-24

## 2020-10-24 RX ORDER — MAGNESIUM SULFATE HEPTAHYDRATE 40 MG/ML
2 INJECTION, SOLUTION INTRAVENOUS DAILY PRN
Status: DISCONTINUED | OUTPATIENT
Start: 2020-10-24 | End: 2020-10-25 | Stop reason: HOSPADM

## 2020-10-24 RX ORDER — MULTIVITAMIN,THERAPEUTIC
1 TABLET ORAL DAILY
COMMUNITY

## 2020-10-24 RX ORDER — OXYCODONE HYDROCHLORIDE 5 MG/1
5 TABLET ORAL ONCE
Status: COMPLETED | OUTPATIENT
Start: 2020-10-24 | End: 2020-10-24

## 2020-10-24 RX ORDER — POLYETHYLENE GLYCOL 3350 17 G/17G
17 POWDER, FOR SOLUTION ORAL DAILY
Status: DISCONTINUED | OUTPATIENT
Start: 2020-10-24 | End: 2020-10-25 | Stop reason: HOSPADM

## 2020-10-24 RX ORDER — AMOXICILLIN 250 MG
2 CAPSULE ORAL 2 TIMES DAILY
Status: DISCONTINUED | OUTPATIENT
Start: 2020-10-24 | End: 2020-10-25 | Stop reason: HOSPADM

## 2020-10-24 RX ORDER — MAGNESIUM SULFATE HEPTAHYDRATE 40 MG/ML
4 INJECTION, SOLUTION INTRAVENOUS EVERY 4 HOURS PRN
Status: DISCONTINUED | OUTPATIENT
Start: 2020-10-24 | End: 2020-10-25 | Stop reason: HOSPADM

## 2020-10-24 RX ORDER — IOPAMIDOL 755 MG/ML
82 INJECTION, SOLUTION INTRAVASCULAR ONCE
Status: COMPLETED | OUTPATIENT
Start: 2020-10-24 | End: 2020-10-24

## 2020-10-24 RX ORDER — OXYCODONE AND ACETAMINOPHEN 5; 325 MG/1; MG/1
1 TABLET ORAL EVERY 6 HOURS PRN
Status: ON HOLD | COMMUNITY
End: 2020-10-25

## 2020-10-24 RX ORDER — HYDROMORPHONE HYDROCHLORIDE 1 MG/ML
0.3 INJECTION, SOLUTION INTRAMUSCULAR; INTRAVENOUS; SUBCUTANEOUS ONCE
Status: COMPLETED | OUTPATIENT
Start: 2020-10-24 | End: 2020-10-24

## 2020-10-24 RX ORDER — OXYCODONE HYDROCHLORIDE 5 MG/1
5-10 TABLET ORAL EVERY 4 HOURS PRN
Status: DISCONTINUED | OUTPATIENT
Start: 2020-10-24 | End: 2020-10-25

## 2020-10-24 RX ORDER — OXYCODONE HYDROCHLORIDE 5 MG/1
5-10 TABLET ORAL EVERY 4 HOURS PRN
Status: DISCONTINUED | OUTPATIENT
Start: 2020-10-24 | End: 2020-10-24

## 2020-10-24 RX ORDER — FENTANYL CITRATE 50 UG/ML
25 INJECTION, SOLUTION INTRAMUSCULAR; INTRAVENOUS ONCE
Status: DISCONTINUED | OUTPATIENT
Start: 2020-10-24 | End: 2020-10-24

## 2020-10-24 RX ORDER — POTASSIUM CL/LIDO/0.9 % NACL 10MEQ/0.1L
10 INTRAVENOUS SOLUTION, PIGGYBACK (ML) INTRAVENOUS
Status: DISCONTINUED | OUTPATIENT
Start: 2020-10-24 | End: 2020-10-25 | Stop reason: HOSPADM

## 2020-10-24 RX ORDER — DESOGESTREL AND ETHINYL ESTRADIOL 0.15-0.03
1 KIT ORAL DAILY
Status: ON HOLD | COMMUNITY
End: 2020-10-25

## 2020-10-24 RX ORDER — FENTANYL CITRATE 50 UG/ML
25 INJECTION, SOLUTION INTRAMUSCULAR; INTRAVENOUS ONCE
Status: COMPLETED | OUTPATIENT
Start: 2020-10-24 | End: 2020-10-24

## 2020-10-24 RX ORDER — ACETAMINOPHEN 500 MG
500 TABLET ORAL EVERY 6 HOURS PRN
COMMUNITY

## 2020-10-24 RX ORDER — AMOXICILLIN 250 MG
1-2 CAPSULE ORAL 2 TIMES DAILY
Status: DISCONTINUED | OUTPATIENT
Start: 2020-10-24 | End: 2020-10-25 | Stop reason: HOSPADM

## 2020-10-24 RX ORDER — POTASSIUM CHLORIDE 1.5 G/1.58G
20-40 POWDER, FOR SOLUTION ORAL
Status: DISCONTINUED | OUTPATIENT
Start: 2020-10-24 | End: 2020-10-25 | Stop reason: HOSPADM

## 2020-10-24 RX ORDER — ONDANSETRON 2 MG/ML
4 INJECTION INTRAMUSCULAR; INTRAVENOUS EVERY 6 HOURS PRN
Status: DISCONTINUED | OUTPATIENT
Start: 2020-10-24 | End: 2020-10-25 | Stop reason: HOSPADM

## 2020-10-24 RX ORDER — ONDANSETRON 4 MG/1
4 TABLET, ORALLY DISINTEGRATING ORAL EVERY 6 HOURS PRN
Status: DISCONTINUED | OUTPATIENT
Start: 2020-10-24 | End: 2020-10-25 | Stop reason: HOSPADM

## 2020-10-24 RX ORDER — IPRATROPIUM BROMIDE 21 UG/1
2 SPRAY, METERED NASAL 2 TIMES DAILY PRN
COMMUNITY

## 2020-10-24 RX ORDER — DOXYCYCLINE 100 MG/1
100 CAPSULE ORAL 2 TIMES DAILY
COMMUNITY
End: 2021-03-10

## 2020-10-24 RX ORDER — DOXYCYCLINE 100 MG/1
100 CAPSULE ORAL 2 TIMES DAILY
Status: DISCONTINUED | OUTPATIENT
Start: 2020-10-24 | End: 2020-10-25 | Stop reason: HOSPADM

## 2020-10-24 RX ADMIN — Medication 1 TABLET: at 18:29

## 2020-10-24 RX ADMIN — SODIUM CHLORIDE 1000 ML: 9 INJECTION, SOLUTION INTRAVENOUS at 05:34

## 2020-10-24 RX ADMIN — LIDOCAINE 1 PATCH: 560 PATCH PERCUTANEOUS; TOPICAL; TRANSDERMAL at 18:59

## 2020-10-24 RX ADMIN — OXYCODONE HYDROCHLORIDE 5 MG: 5 TABLET ORAL at 13:35

## 2020-10-24 RX ADMIN — HYDROMORPHONE HYDROCHLORIDE 0.3 MG: 1 INJECTION, SOLUTION INTRAMUSCULAR; INTRAVENOUS; SUBCUTANEOUS at 14:45

## 2020-10-24 RX ADMIN — ACETAMINOPHEN 975 MG: 325 TABLET, FILM COATED ORAL at 21:44

## 2020-10-24 RX ADMIN — OXYCODONE HYDROCHLORIDE 5 MG: 5 TABLET ORAL at 14:45

## 2020-10-24 RX ADMIN — FENTANYL CITRATE 25 MCG: 50 INJECTION, SOLUTION INTRAMUSCULAR; INTRAVENOUS at 11:12

## 2020-10-24 RX ADMIN — ACETAMINOPHEN 975 MG: 325 TABLET, FILM COATED ORAL at 16:57

## 2020-10-24 RX ADMIN — IOPAMIDOL 82 ML: 755 INJECTION, SOLUTION INTRAVENOUS at 03:22

## 2020-10-24 RX ADMIN — CABERGOLINE 0.5 MG: 0.5 TABLET ORAL at 18:59

## 2020-10-24 RX ADMIN — ALBUMIN HUMAN 500 ML: 0.05 INJECTION, SOLUTION INTRAVENOUS at 08:44

## 2020-10-24 RX ADMIN — DOXYCYCLINE HYCLATE 100 MG: 100 CAPSULE ORAL at 21:44

## 2020-10-24 RX ADMIN — DOCUSATE SODIUM 50 MG AND SENNOSIDES 8.6 MG 2 TABLET: 8.6; 5 TABLET, FILM COATED ORAL at 19:33

## 2020-10-24 RX ADMIN — HYDROMORPHONE HYDROCHLORIDE 0.2 MG: 0.2 INJECTION, SOLUTION INTRAMUSCULAR; INTRAVENOUS; SUBCUTANEOUS at 16:57

## 2020-10-24 RX ADMIN — CALCIUM GLUCONATE 2 G: 98 INJECTION, SOLUTION INTRAVENOUS at 18:28

## 2020-10-24 RX ADMIN — POLYETHYLENE GLYCOL 3350 17 G: 17 POWDER, FOR SOLUTION ORAL at 16:57

## 2020-10-24 RX ADMIN — SODIUM CHLORIDE 1000 ML: 9 INJECTION, SOLUTION INTRAVENOUS at 02:15

## 2020-10-24 RX ADMIN — FENTANYL CITRATE 25 MCG: 50 INJECTION, SOLUTION INTRAMUSCULAR; INTRAVENOUS at 08:07

## 2020-10-24 RX ADMIN — THERA TABS 1 TABLET: TAB at 18:29

## 2020-10-24 RX ADMIN — ONDANSETRON 4 MG: 2 INJECTION INTRAMUSCULAR; INTRAVENOUS at 22:30

## 2020-10-24 RX ADMIN — HYDROMORPHONE HYDROCHLORIDE 0.2 MG: 0.2 INJECTION, SOLUTION INTRAMUSCULAR; INTRAVENOUS; SUBCUTANEOUS at 23:18

## 2020-10-24 RX ADMIN — HYDROMORPHONE HYDROCHLORIDE 0.2 MG: 0.2 INJECTION, SOLUTION INTRAMUSCULAR; INTRAVENOUS; SUBCUTANEOUS at 18:59

## 2020-10-24 RX ADMIN — MENTHOL 1 PATCH: 205.5 PATCH TOPICAL at 20:25

## 2020-10-24 ASSESSMENT — ACTIVITIES OF DAILY LIVING (ADL)
ADLS_ACUITY_SCORE: 14
ADLS_ACUITY_SCORE: 13

## 2020-10-24 NOTE — ED TRIAGE NOTES
"Pt had an egg retrieval procedure today and developed sharp right chest pain, abdominal pain, bloating, and urinary retention since 2100. Her  states that they were warned about OHSS at the procedure and that it can cause these complications. Pt says she \"basically passed out\" from the pain at home this evening.   "

## 2020-10-24 NOTE — PHARMACY-ADMISSION MEDICATION HISTORY
Admission Medication History Completed by Pharmacy    See [facility] Admission Navigator for allergy information, preferred outpatient pharmacy, prior to admission medications and immunization status.     Medication History Sources:     Patient, dispense report, care everywhere.    Changes made to PTA medication list (reason):    Added:   o Mutlivitamin  o CoQ10  o Ipratropium nasal spray  o Desogestrel-ethinyl estradiol  o Vitamin d3 - calcium  o Tylenol    Deleted:   o Ibuprofen (advil PO)  o Pseudoephedrine 30mg tablet - take by mouth every 4 hours as needed for congestion  o Vitamin D PO    Changed:   o Adderall PO - no directions to Take 10mg by mouth three times daily  o Cabergoline 0.5mg tablet - take 0.25mg by mouth daily (with dinner) to Take 0.5mg by mouth daily (with dinner)    Additional Information:    Medications verified with patient via Ipad.    Of note:  -Pt receives brand name Isibloom - same strength as formulary [employer].  -Pt says her last scheduled hormone injections was Wednesday, 10/21.  -Pt says she just started taking Doxycycline, first dose was last night (10/23 PM) and did not take a dose this morning (10/24 AM)      Prior to Admission medications    Medication Sig Last Dose Taking? Auth Provider   acetaminophen (TYLENOL) 500 MG tablet Take 500 mg by mouth every 6 hours as needed for mild pain 10/23/2020 at PM Yes Unknown, Entered By History   Amphetamine-Dextroamphetamine (ADDERALL PO) Take 10 mg by mouth 3 times daily  10/23/2020 at PM Yes Reported, Patient   cabergoline (DOSTINEX) 0.5 MG tablet Take 0.5 mg by mouth daily (with dinner)  10/23/2020 at PM Yes Reported, Patient   calcium carbonate-vitamin D (OS-RAVI) 500-400 MG-UNIT tablet Take 1 tablet by mouth daily 10/23/2020 at PM Yes Unknown, Entered By History   co-enzyme Q-10 100 MG CAPS capsule Take 600 mg by mouth daily 10/23/2020 at PM Yes Unknown, Entered By History   desogestrel-ethinyl estradiol (APRI) 0.15-30 MG-MCG tablet Take 1  tablet by mouth daily 10/23/2020 at PM Yes Unknown, Entered By History   doxycycline hyclate (VIBRAMYCIN) 100 MG capsule Take 100 mg by mouth 2 times daily 10/23/2020 at PM Yes Reported, Patient   ipratropium (ATROVENT) 0.03 % nasal spray Spray 2 sprays into both nostrils 2 times daily as needed 10/23/2020 at PM Yes Unknown, Entered By History   multivitamin, therapeutic (THERA-VIT) TABS tablet Take 1 tablet by mouth daily 10/23/2020 at PM Yes Unknown, Entered By History   oxyCODONE-acetaminophen (PERCOCET) 5-325 MG tablet Take 1 tablet by mouth every 6 hours as needed for breakthrough pain or severe pain 10/23/2020 at PM Yes Reported, Patient       Date completed: 10/24/20    Medication history completed by: James Hansen

## 2020-10-24 NOTE — PROGRESS NOTES
STAFF NOTE:  39F ADHD undergoing IVF  Abdominal bloating / LLQ pain 3d ago  Limiting movement of legs when walking b/c stretching worsens pain  Also some rigors    Egg retrieval yesterday  Worsened bloating and pain; bilat shoulder strap pain  Presented to ED last night  Hypotension / near-syncope    Exam awake / lucid  Appears generally uncomfortable  HR 70-80s; SBP 80s-90s  Abdomen tense  Some edema    Labs show down-trending Hgb  Lytes all fine; lactate 0.6 and normal Cr    CT large ovaries, large fluid collection concerning for hemorrhage.    Ascites in both paracolic gutters    This is a 39F who presents with either hemorrhagic ovarian cyst rupture or possible ovarian hyperstimulation syndrome.  The latter seems less likely given relatively normal electrolytes, the timing, and a clear drop in Hgb (although some of that is related to dilutional effect from fluid boluses).  For now, I do not hve reason to suspect concurrent sepsis, but given her rigors and leukocytosis this morning, will have a low threshold to start antibiotics appropriate for vaginal yannick if she develops fevers and will check blood cultures.     ADHD:  -adderol    OVARIAN HYPERSTIMULATION SYNDROME VS OVARIAN CYST RUPTURE:  -at risk for pleural effusions and ascites if truly a severe OHSS, but it is apparently unusual to get this so soon after egg retrieval.  Sodium /potassium normal and Hgb falling, which suggests bleeding after egg retrieval more than OHSS   -watch for decreasing UOP or increasing weight   -watch for thrombotic complications: will consult her primary [facility] doc (Fang Miles [phone]); I anticipate holding estrogens and that the fetus will be frozen with a plan to not re-implant during this cycle, but rather wait until she returns to more normal periods  -if starts to act septic, think vaginal yannick and start amp/gent/flagyl    ACUTE BLOOD LOSS ANEMIA:  -original Hgb supposedly ~13, now 7.2 after aggressive fluid  resuscitation.  monitoring with serial Hgb.  No evidence of ongoing active hemorrhage, so will use a transfusion trigger of Hgb <7 going forward (higher if still seems to be actively bleeding).  -if this was a cyst rupture, I anticipate spontaneous cessation / tamponade and nothing more to do.  -gyn onc is considering dx laparoscopy->laparotomy, I believe, should she continue to look like she's actively bleeding     CONSTIPATION:  -aggressive bowel regimen    MISC:  -Code status is full code  -clear liquid diet for now  -family updated by CALEB  -SQH to be held; PPI not necssary  -lines: peripheral only  -zhong for close UOP monitoring  -anticipate discharge to home in ~2 days          Physician Attestation   I, LYN Rubin, have reviewed and discussed with the advanced practice provider their history, physical and plan for this patient.  Billing as per advanced practice provider.     LYN Rubin MD  Clinical   Anesthesia / Critical Care  *[phone]

## 2020-10-24 NOTE — PHARMACY-CONSULT NOTE
"The following home medications were NOT continued on inpatient admission per \"Discontinuation of nonessential home medications during hospitalization\" policy: Coenzyme Q10    If a therapeutic holiday is deemed inappropriate per the prescriber, please notify the pharmacist regarding the medication order.    The pharmacist is available to answer any questions and/or concerns the patient may have regarding discontinuation of non-essential medications.    Please ensure that these medications are restarted as needed upon discharge via the medication reconciliation discharge process and included on the discharge medication reconciliation report.    Thank you,  Yunior Espinoza, PharmD, BCPS    "

## 2020-10-24 NOTE — H&P
SURGICAL ICU ADMISSION NOTE  10/24/2020    PRIMARY TEAM: Gyn-onc  PRIMARY PHYSICIAN:    REASON FOR CRITICAL CARE ADMISSION: Hemodynamic monitoring and serial labs   ADMITTING PHYSICIAN: Dr. Rubin     SICU Daily Progress Note  [facility]  October 24, 2020    Assessment: Soledad Botello is a 39 year old female who as admitted on 10/24/20 after an egg retrieval 10/23/20 with severe abd pain and bloating. Imaging and exam concerning for ovarian hyperstimulation syndrome vs hemoperitoneum in the setting of ruptured ovarian cyst.       Plan by system:  Neuro  #Acute abdominal pain    Pain  o Tylenol 975 mg q6h scheduled  o Oxycodone 5-10 mg q4h PRN  o Hydromorphone 0.2 mg q2h PRN  o Ibuprofen 600 mg q6hh PRN, hold if creatinine rising    #ADHD    Continue home adderal 10 mg TID     Respiratory  No acute issues  At risk for developing pleural effusions in the setting of suspected ovarian hyperstimulation syndrome.    Supplemental O2 to keep SpO2 > 92%    Obtain chest xray if new O2 requirements develop or complaints of shortness of breath    Cardiovascular  #Shock, multifactorial - hemorrhagic, hypovolemic  Prior to arrival pt experiencing dizziness, near syncope, abdominal pain, and nausea. Hypotensive since arrival with SBP 80-90, MAP low 60's, remains fluid responsive. Patient is an active athlete and has a low resting HR and SBP ~ 90's PTA. Hgb on arrival 10.3, now dropped to 7.2 with CT findings suggestive of hemoperitoneum and possible OHSS. Shock is likely multifactorial in the setting of presumed bleeding and intravascular volume depletion 2/2 thirdspacing in the setting of OHSS. S/p fluid resuscitation and PRBC transfusion. Plan to monitor hemodynamics closely and follow serial CBCs transfusing when necessary.    Hemodynamic support, goal MAP > 65 mmHg via NIBP  o ED IV fluid resuscitation: 3L NS + 0.5L albumin + 1 unit PRBC  o LA 0.6, repeat on ICU arrival   o Transfuse for Hgb < 7.0 or hypotension with  Hgb < 8.0  o Bolus with LR     CBC upon ICU arrival and q6h thereafter    Continuous cardiac monitor     Gastrointestinal/Nutrition  #Nausea   #Constipation    Diet: Clear liquid, advance 10/25 pending stability    Last BM 10/21, regimen  o Senna-docusate 2 tabs BID scheduled  o Miralax 17g every day scheduled     Ondansetron 4 mg q6h PRN    Continue prenatal vitamin and calcium carbonate     Gynecologic/Genitourinary:  # Suspected ovarian hyperstimulation syndrome in the setting of recent egg retrieval for IVF  # Suspected hemoperitoneum in the setting of possible ruptured ovarian cyst  # Urinary retention  Underwent egg retrieval 10/23 with worsening pain in her abd, back, and bilateral shoulders. CT imaging shoed enlarged ovaries, high density fluid collection in the pelvis concerning for hemorrhage, and moderate ascites. POC US showed fluid in the paracolic gutters and abdominal US showed a moderate volume of ascites. Hgb 10.3 on arrival, now 7.2. Overall, her picture is most concerning for hemoperitoneum, although she remains at risk for OHSS. Plan to monitor hemodynamics, urine output, trend CBC and BMP.     Gyn-onc following, appreciate recs    Notify Gyn-onc on call for:     Worsening abdominal pain or change in abdominal exam    Ongoing transfusion requirements    Shortness of breath    Hold PTA desogestrel-esthinyl estradiol to minimize risk for blood clots    Continue PTA cabergoline 0.5 mg at bedtime, ok to hold for nausea     Bonds for strict I&O, notify SICU for UOP < 0.5 ml/kg/hr    BMP q6h to monitor for electrolyte derangements or elevated creatinine    ICU electrolyte replacement protocol       Endocrine  No active issues    Goal -180 for optimal healing    , no indication for insulin at this time    Infectious Diseases  # Perioperative antibiotics  # Query septic component to her hypotension  Pt stated she did experience rigors last night, but denied fevers or abnormal vaginal  discharge. WBC 18.2 on arrival to ED. Will collect blood cultures in the setting of recent invasive procedure.     Cultures  o Blood cultures x2 on ICU arrival to rule out bacteremia  o Asymptomatic COVID-19 testing per hospital policy    Antibiotics   o Continue prophylactic doxycyline 100 mg BID x 3 days     Hematologic  # Risk for hypercoagulability in the setting of potential OHSS    SCDs only for DVT ppx    Hold chemoprophylaxis in the setting of presumed bleeding    Hold PTA desogestrel-ethinyl estradiol to minimize hypercoagulability    MSK    No active issues    Mobilize ad ramesh    Lines/ Tubes/ Drains     PIV x2    General cares:  Prophylaxis    DVT: SCDs only    Stress ulcer: no PPI indicated     Disposition    SICU       Eloina Sainz, CNP  Critical care time 60 minutes   _______________________________________________________________________________________________________________      HISTORY PRESENTING ILLNESS:         Soledad Kapadia is a 39 y.o. F with a PMH significant for ADHD and infertility. She has been engaged in the process of egg retrieval for IVF, which she underwent on 10/23, but developed severe abdomina, back, and bilateral shoulder pain accompanied by nausea, dizziness, and near syncope, which prompted her presentation to the [facility] ED last night at 2300. This had been preceded with 3-4 days of abdominal bloating and pain, but pt states she was able to maintain ADLs and oral intake at that time. Upon arrival to the ED her labs were significant for WBC 18.2 and Hgb 10.3. Reassuringly, her electrolytes were WNL, creatinine 0.89, and LA 0.6. CT abd/pelvis showed bilateral ovarian enlargement with numerous ill-defined cysts compatible with OHSS, high-density fluid collection concerning for ruptured hemorrhagic ovarian cysts, a moderate amount of ascites, and a large amount of stool in her colon. CXR was unremarkable. POC US showed fluid in the pericolic gutters and formal abd US showed  moderate volume ascites, but no mention of blood. She was fluid resuscitated with 3L NS, 0.5L albumin, and recived 1 unit PRBCs for down-trending Hgb (7.2 prior to transfusion) on serial CBCs. A zhong catheter was placed for urinary retention, inability to void, and strict I&O.          Upon examination in the ED her ROS was positive for abdominal and back pain, bloating, difficulty initiating urination, and nausea without vomiting. She stated her bilateral shoulder pain had resolved and her back and abd pain improved with 0.3 mg hydromorphone IV. Vitally, her SBP was in the 90's with MAPS in the low 60s (however she was laying on her side with the BP cuff on her elevated arm), HR 80s, normal WOB on RA. Gyn/onc is following and are not planning any procedures at this time. However, she will be admitted to the SICU for hemodynamic monitoring and serial labs.       REVIEW OF SYSTEMS: 10 point ROS neg other than the symptoms noted above in the HPI.    PAST MEDICAL HISTORY:   Past Medical History:   Diagnosis Date     ADD (attention deficit disorder)        SURGICAL HISTORY:   Past Surgical History:   Procedure Laterality Date     BREAST SURGERY      augmentation     ENT SURGERY      tonsillectomy     wisdom teeth         SOCIAL HISTORY:   Social History     Socioeconomic History     Marital status:      Spouse name: None     Number of children: None     Years of education: None     Highest education level: None   Occupational History     None   Social Needs     Financial resource strain: None     Food insecurity     Worry: None     Inability: None     Transportation needs     Medical: None     Non-medical: None   Tobacco Use     Smoking status: Never Smoker     Smokeless tobacco: Never Used   Substance and Sexual Activity     Alcohol use: Yes     Alcohol/week: 8.0 standard drinks     Types: 4 Glasses of wine, 4 Cans of beer per week     Drug use: No     Sexual activity: None   Lifestyle     Physical activity      Days per week: None     Minutes per session: None     Stress: None   Relationships     Social connections     Talks on phone: None     Gets together: None     Attends Oriental orthodox service: None     Active member of club or organization: None     Attends meetings of clubs or organizations: None     Relationship status: None     Intimate partner violence     Fear of current or ex partner: None     Emotionally abused: None     Physically abused: None     Forced sexual activity: None   Other Topics Concern     None   Social History Narrative     None       FAMILY HISTORY: No bleeding/clotting disorders nor problems with anesthesia.     ALLERGIES:    No Known Allergies    MEDICATIONS:  No current facility-administered medications on file prior to encounter.        acetaminophen (TYLENOL) 500 MG tablet, Take 500 mg by mouth every 6 hours as needed for mild pain       Amphetamine-Dextroamphetamine (ADDERALL PO), Take 10 mg by mouth 3 times daily        cabergoline (DOSTINEX) 0.5 MG tablet, Take 0.5 mg by mouth daily (with dinner)        calcium carbonate-vitamin D (OS-RAVI) 500-400 MG-UNIT tablet, Take 1 tablet by mouth daily       co-enzyme Q-10 100 MG CAPS capsule, Take 600 mg by mouth daily       desogestrel-ethinyl estradiol (APRI) 0.15-30 MG-MCG tablet, Take 1 tablet by mouth daily       doxycycline hyclate (VIBRAMYCIN) 100 MG capsule, Take 100 mg by mouth 2 times daily       ipratropium (ATROVENT) 0.03 % nasal spray, Spray 2 sprays into both nostrils 2 times daily as needed       multivitamin, therapeutic (THERA-VIT) TABS tablet, Take 1 tablet by mouth daily       oxyCODONE-acetaminophen (PERCOCET) 5-325 MG tablet, Take 1 tablet by mouth every 6 hours as needed for breakthrough pain or severe pain        PHYSICAL EXAMINATION:  Temp:  [97.4  F (36.3  C)-99.1  F (37.3  C)] 99.1  F (37.3  C)  Pulse:  [] 89  Resp:  [11-30] 11  BP: ()/(39-73) 82/50  SpO2:  [92 %-100 %] 98 %    Physical Exam:  General: Patient  laying on ED litter on her side with her knees curled in, appears unwell but appearance did improve after administration of IV opioids  HEENT: Normocephalic, atraumatic, oral mucosa mosit, neck supple  Resp: WOB easy, lung sounds CTA bilaterally and throughout, no rales, rhonchi, or wheezing, no cough  CV: S1S2, no murmurs, rubs, or gallops  GI: Abd distended, soft, diffusely tender to palpation, most tender in the lower quadrants, bowel sounds active in all 4 quadrants, last BM 10/21/20  : Bonds in place draining clear florence urine  Extremities: Warm, well perfused, no bilateral lower extremity edema  Neuro: A&Ox 4, pupils 3 mm, PERRL, cranial nerves II-XII grossly intact       LABS: Reviewed.   Arterial Blood Gases   No lab results found in last 7 days.  Complete Blood Count   Recent Labs   Lab 10/24/20  1133 10/24/20  0751 10/24/20  0203   WBC 10.1 13.1* 18.2*   HGB 7.2* 8.3* 10.3*   * 156 229     Basic Metabolic Panel  Recent Labs   Lab 10/24/20  0203      POTASSIUM 3.6   CHLORIDE 106   CO2 26   BUN 16   CR 0.85   *     Liver Function Tests  Recent Labs   Lab 10/24/20  0835 10/24/20  0203   AST  --  24   ALT  --  47   ALKPHOS  --  45   BILITOTAL  --  0.2   ALBUMIN  --  3.5   INR 1.03  --      Pancreatic Enzymes  No lab results found in last 7 days.  Coagulation Profile  Recent Labs   Lab 10/24/20  0835   INR 1.03   PTT 29       IMAGING:  Recent Results (from the past 24 hour(s))   CT Abdomen Pelvis w Contrast    Narrative    EXAM: CT ABDOMEN PELVIS W CONTRAST  LOCATION: [facility]  DATE/TIME: 10/24/2020 3:27 AM    INDICATION: Abdominal pain. Recent egg retrieval procedure.    COMPARISON: None.    TECHNIQUE: CT scan of the abdomen and pelvis was performed following injection of IV contrast. Multiplanar reformats were obtained. Dose reduction techniques were used.    CONTRAST: Iopamidol (Isovue-370) solution 82 mL.       FINDINGS:   LOWER CHEST: Mild subpleural atelectasis in the  lung bases.    HEPATOBILIARY: Normal.    PANCREAS: Normal.    SPLEEN: Normal.    ADRENAL GLANDS: Normal.    KIDNEYS/BLADDER: 4 mm nonobstructing stone right kidney. Kidneys are otherwise negative. No hydronephrosis. Bonds catheter in the bladder.    BOWEL: Bowel is normal in caliber with no evidence for obstruction. Negative for appendicitis. Large amount of stool in the colon.    LYMPH NODES: Normal.    VASCULATURE: Unremarkable.    PELVIC ORGANS: Ovaries are markedly enlarged bilaterally with poorly defined margins. Numerous cysts are seen throughout both ovaries with findings compatible with ovarian hyperstimulation. For example, the left ovary is suspected to measure up to at   least 9 cm in diameter. Ill-defined areas of heterogeneous density within the pelvis. Some of this may relate to prominent ovarian stroma. However, there is also concern for surrounding areas of hemorrhage including some high-density material layering   within fluid in the right side of the pelvis posteriorly on series 5 image 360 as well as in the lower abdomen anteriorly just anterior and superior to the uterus, for example on image 343. Findings could be seen with ruptured hemorrhagic ovarian   follicles. In addition, there is a moderate amount of ascites in the abdomen and pelvis extending all of the way to the upper abdomen and surrounding the liver and spleen. This could also relate to ruptured ovarian cysts and clinical correlation is   needed.    MUSCULOSKELETAL: Normal.      Impression    IMPRESSION:   1.  Both ovaries are markedly enlarged and somewhat ill-defined with numerous cysts. Findings are compatible with ovarian hyperstimulation syndrome.    2.  Ill-defined areas of heterogeneity within and around the ovaries and the pelvis. While some of this may be due to ovarian stroma, there is also concern for surrounding hemorrhage with some high-density material in the dependent portion of the pelvis   as well as in the lower  abdomen anteriorly just above the uterus. Findings likely reflect ruptured hemorrhagic ovarian cysts.    3.  In addition there is a moderate amount of ascites in the abdomen and pelvis, including in the upper abdomen around the spleen and liver. This most likely relates to ruptured ovarian cysts and clinical correlation is needed.    4.  No acute bowel findings. Large amount of stool in the colon.    5.  4 mm nonobstructing stone right kidney.     XR Chest Port 1 View    Narrative    EXAM: XR CHEST PORT 1 VW  LOCATION: [facility]  DATE/TIME: 10/24/2020 3:36 AM    INDICATION: Abdominal pain, shoulder pain  COMPARISON: None.      Impression    IMPRESSION: Negative chest.   POC US ABDOMEN LIMITED    Impression    Limited Bedside Abdominal Ultrasound, performed and interpreted by me.     Indication: Abdominal swelling. Evaluate for Free fluid.     With the patient in Trendelenburg, the RUQ, LUQ, (including the paracolic gutters) views were examined for free fluid. With the patient in reverse Trendelenburg, the super pubic view was examined for free fluid.     Findings: There was a small amount of free fluid in the bilateral pericolic gutters, no significant fluid noted in the hepatorenal space.    IMPRESSION: Free fluid present as noted above.   US Abdomen Limited    Narrative    Exam: US ABDOMEN LIMITED, 10/24/2020 12:02 PM    Indication: Ovarian hyperstimulation vs bleeding hemorrhagic cyst.  Please assess for tappable fluid in abdomen and pelvis    Comparison: 10/24/2020 point of care ultrasound    Findings/    Impression    Impression: Limited four-quadrant ultrasound demonstrates small to  moderate volume ascites, predominantly in the right upper and lower  quadrants.    I have personally reviewed the examination and initial interpretation  and I agree with the findings.    FAITH VAUGHN MD

## 2020-10-24 NOTE — PROGRESS NOTES
Patient Update    S:  Patient continues to complain of abdominal distension, abdominal pain (predominantly epigastric, umbilical), and shoulder pain. At times abdominal pain is sharp. Has received IV fentanyl with minimal relief of symptoms. Denies nausea/vomiting, but has not eaten since last night. Feels lightheaded. Constipated, has not had a BM for 3 days.    Ovarian stimulation protocol: Gonal F and Menopur (gonadotropins)  Trigger: Lupron  Eggs retrieved: 31  AMH, AFC, Number follicles recruited: Unknown    Exam:  Gen: Laying in bed, mild distress  Cards: RRR  Pulm: NWOB, CTAB  Abd: Tense, dull to percussion, distended, no fluid wave, no peritoneal signs  Ext: non-tender, non-edematous    Exam unchanged over ~3 hours in ED    Vitals:  Patient remains hypotensive, but vitals otherwise stable. Satting >95% on RA, intermittently tachyphemic, non-tachycardic. Pressures 80s-90s / 50s-60s. In ED received 3L of saline and 500cc of 5% albumin. Also received 1U pRBC.    Patient Vitals for the past 24 hrs:   BP Temp Temp src Pulse Resp SpO2 Weight   10/24/20 1600 99/60 -- -- 86 -- 100 % --   10/24/20 1545 92/60 -- -- 80 -- 99 % --   10/24/20 1530 90/58 -- -- 82 -- -- --   10/24/20 1505 (!) 82/45 -- -- 92 22 99 % --   10/24/20 1500 (!) 82/50 -- -- 89 11 98 % --   10/24/20 1445 (!) 85/49 -- -- 84 13 98 % --   10/24/20 1435 (!) 86/46 99.1  F (37.3  C) Oral 83 18 99 % --   10/24/20 1430 (!) 84/46 -- -- 82 18 99 % --   10/24/20 1425 (!) 84/47 98.5  F (36.9  C) Oral 88 14 97 % --   10/24/20 1420 91/56 -- -- 84 22 99 % --   10/24/20 1415 99/58 -- -- 85 24 98 % --   10/24/20 1410 93/58 -- -- 83 22 100 % --   10/24/20 1405 100/60 -- -- 82 21 98 % --   10/24/20 1400 102/60 -- -- 88 24 99 % --   10/24/20 1345 96/66 -- -- 84 22 99 % --   10/24/20 1340 92/55 -- -- 86 13 100 % --   10/24/20 1330 100/59 -- -- 85 20 100 % --   10/24/20 1325 94/57 -- -- 83 16 99 % --   10/24/20 1320 92/55 -- -- 86 14 98 % --   10/24/20 1318 (!) 83/54 --  -- 86 19 99 % --   10/24/20 1317 (!) 79/48 -- -- 84 21 98 % --   10/24/20 1315 (!) 82/48 -- -- 83 19 98 % --   10/24/20 1310 -- -- -- 80 18 98 % --   10/24/20 1300 94/59 -- -- 85 26 98 % --   10/24/20 1245 93/56 -- -- 77 26 98 % --   10/24/20 1230 (!) 82/59 -- -- 89 30 97 % --   10/24/20 1225 95/57 -- -- 79 26 98 % --   10/24/20 1220 94/56 -- -- 78 16 97 % --   10/24/20 1215 94/56 -- -- 78 23 98 % --   10/24/20 1210 93/57 -- -- 77 19 97 % --   10/24/20 1200 90/54 -- -- 82 13 98 % --   10/24/20 1145 (!) 86/52 -- -- 82 28 98 % --   10/24/20 1135 95/51 -- -- 83 18 98 % --   10/24/20 1130 (!) 86/59 -- -- 82 14 96 % --   10/24/20 1115 95/56 -- -- 81 14 99 % --   10/24/20 1110 97/53 -- -- 85 20 98 % --   10/24/20 1105 93/58 -- -- 88 25 99 % --   10/24/20 1100 (!) 83/52 -- -- 87 22 95 % --   10/24/20 1055 (!) 83/52 -- -- 83 24 98 % --   10/24/20 1045 (!) 77/39 -- -- 90 23 97 % --   10/24/20 1030 95/61 -- -- 78 15 98 % --   10/24/20 1015 95/61 -- -- 77 25 97 % --   10/24/20 1000 97/61 -- -- 77 28 99 % --   10/24/20 0955 95/61 -- -- 76 28 98 % --   10/24/20 0945 96/64 -- -- 78 24 98 % --   10/24/20 0940 97/61 -- -- 77 28 100 % --   10/24/20 0935 (!) 88/59 -- -- 78 24 99 % --   10/24/20 0930 (!) 89/57 -- -- 78 26 100 % --   10/24/20 0915 90/59 -- -- 77 17 99 % --   10/24/20 0910 92/52 -- -- 76 21 100 % --   10/24/20 0905 90/58 -- -- 77 24 92 % --   10/24/20 0900 92/59 -- -- 76 28 97 % --   10/24/20 0855 91/57 -- -- 75 28 100 % --   10/24/20 0850 90/58 -- -- 78 26 100 % --   10/24/20 0845 95/53 -- -- 76 28 99 % --   10/24/20 0840 94/54 -- -- 75 27 100 % --   10/24/20 0835 90/52 -- -- 83 29 100 % --   10/24/20 0830 93/55 -- -- 81 16 100 % --   10/24/20 0825 98/54 -- -- 75 18 100 % --   10/24/20 0820 (!) 80/69 -- -- 109 19 100 % --   10/24/20 0815 92/55 -- -- 74 19 100 % --   10/24/20 0810 90/56 -- -- 76 29 98 % --   10/24/20 0805 92/54 -- -- 77 28 98 % --   10/24/20 0800 90/56 -- -- 76 26 99 % --   10/24/20 0755 (!) 85/52 -- --  76 19 98 % --   10/24/20 0750 (!) 89/51 -- -- 75 20 98 % --   10/24/20 0745 (!) 80/47 -- -- 78 19 99 % --   10/24/20 0730 (!) 86/50 -- -- 92 28 96 % --   10/24/20 0700 92/57 -- -- 81 15 99 % --   10/24/20 0645 93/52 -- -- 79 14 97 % --   10/24/20 0600 92/56 -- -- 78 16 98 % --   10/24/20 0515 92/57 -- -- 75 15 96 % --   10/24/20 0430 95/61 -- -- 74 28 97 % --   10/24/20 0345 93/60 -- -- 75 19 100 % --   10/24/20 0335 101/57 -- -- 79 18 99 % --   10/24/20 0332 -- -- -- 80 19 100 % --   10/24/20 0331 -- -- -- 84 22 98 % --   10/24/20 0330 98/62 -- -- 80 17 99 % --   10/24/20 0256 -- -- -- 65 13 100 % --   10/24/20 0255 99/66 -- -- 68 18 100 % --   10/24/20 0227 95/73 -- -- 74 18 100 % 61.2 kg (135 lb)   10/24/20 0215 90/40 -- -- 67 -- 100 % --   10/24/20 0205 96/53 -- -- 66 -- 98 % --   10/24/20 0201 -- 97.9  F (36.6  C) Oral -- -- 100 % --   10/24/20 0200 (!) 72/49 97.4  F (36.3  C) Oral 71 16 98 % --     UOP:  Bonds in place. Urine output excellent: 1700cc over 13 hrs (0200 to 1500) --> 2.1cc/kg.hr    Labs:  In sum, labs initially notable for leukocytosis, elevated hematocrit, and thrombocytosis. Following resuscitation with IV fluids all parameters dropped, notable for a hgb of 7.2. Other labs wnl including Cr, BMP, LFT, coags.    Results for ZAINAB VALDES (MRN [id number]) as of 10/24/2020 16:08   Ref. Range 10/24/2020 02:03 10/24/2020 07:51 10/24/2020 11:33   WBC Latest Ref Range: 4.0 - 11.0 10e9/L 18.2 (H) 13.1 (H) 10.1   Hemoglobin Latest Ref Range: 11.7 - 15.7 g/dL 10.3 (L) 8.3 (L) 7.2 (L)   Hematocrit Latest Ref Range: 35.0 - 47.0 % 31.4 (L) 24.8 (L) 22.6 (L)   Platelet Count Latest Ref Range: 150 - 450 10e9/L 229 156 142 (L)     Results for ZAINAB VALDES (MRN [id number]) as of 10/24/2020 16:08   Ref. Range 10/24/2020 02:03   Sodium Latest Ref Range: 133 - 144 mmol/L 138   Potassium Latest Ref Range: 3.4 - 5.3 mmol/L 3.6   Chloride Latest Ref Range: 94 - 109 mmol/L 106   Carbon Dioxide Latest Ref  Range: 20 - 32 mmol/L 26   Urea Nitrogen Latest Ref Range: 7 - 30 mg/dL 16   Creatinine Latest Ref Range: 0.52 - 1.04 mg/dL 0.85   GFR Estimate Latest Ref Range: >60 mL/min/1.73_m2 87   GFR Estimate If Black Latest Ref Range: >60 mL/min/1.73_m2 >90   Calcium Latest Ref Range: 8.5 - 10.1 mg/dL 8.4 (L)   Anion Gap Latest Ref Range: 3 - 14 mmol/L 6   Albumin Latest Ref Range: 3.4 - 5.0 g/dL 3.5   Protein Total Latest Ref Range: 6.8 - 8.8 g/dL 6.3 (L)   Bilirubin Total Latest Ref Range: 0.2 - 1.3 mg/dL 0.2   Alkaline Phosphatase Latest Ref Range: 40 - 150 U/L 45   ALT Latest Ref Range: 0 - 50 U/L 47   AST Latest Ref Range: 0 - 45 U/L 24   Bilirubin Direct Latest Ref Range: 0.0 - 0.2 mg/dL <0.1     Results for ZAINAB VALDES (MRN [id number]) as of 10/24/2020 16:08   Ref. Range 10/24/2020 08:35   INR Latest Ref Range: 0.86 - 1.14  1.03   PTT Latest Ref Range: 22 - 37 sec 29     Imaging:  CT AP:  1.  Both ovaries are markedly enlarged and somewhat ill-defined with numerous cysts. Findings are compatible with ovarian hyperstimulation syndrome.     2.  Ill-defined areas of heterogeneity within and around the ovaries and the pelvis. While some of this may be due to ovarian stroma, there is also concern for surrounding hemorrhage with some high-density material in the dependent portion of the pelvis   as well as in the lower abdomen anteriorly just above the uterus. Findings likely reflect ruptured hemorrhagic ovarian cysts.     3.  In addition there is a moderate amount of ascites in the abdomen and pelvis, including in the upper abdomen around the spleen and liver. This most likely relates to ruptured ovarian cysts and clinical correlation is needed.     4.  No acute bowel findings. Large amount of stool in the colon.     5.  4 mm nonobstructing stone right kidney.    ABD US:  Impression: Limited four-quadrant ultrasound demonstrates small to  moderate volume ascites, predominantly in the right upper and  lower  quadrants.    CXR:  IMPRESSION: Negative chest.    A/P:  39 year old admitted for abdominal pain and hypotension POD#1 s/p egg retrieval following ovarian stimulation with a Lupron trigger. Discussed patient with her [facility]ENN[facility] provider, Dr. Fang Miles, and Gyn attending Dr. Givens. Although clinical picture resembles OHSS, OHSS unlikely given that patient was triggered with Lupron rather than bHCG. Suspect fluid seen in abdomen is due to bleeding from ovaries after the egg retrieval and from the hemorrhagic cyst noted on US, though may be some component of ascites. Abdominal pain and distension likely due to very enlarged ovaries and peritoneal inflammation from blood leakage into abdomen. Constipation may also play a role (last BM 3 days ago). Shoulder pain likely referred from peritoneal irritation. Suspect hypotension is due to this bleeding, and likely dehydration as patient has had little PO. Global downtrend in CBC s/p IV resuscitation supports this. However, hgb is low, at 7.2, thus agree with transfusion. Other normal labs including normal Na, K, LFTs, and Cr also support a non-OHSS picture. Blood pressure has been soft with systolics in 70-90s while in the ED. Initially had planned for patient to be transferred to the gyn service on the [facility], however given these low BPs patient instead admitted to SICU for closer observation. Pending clinical improvement, patient can be transferred to the floor vs gyn service on [facility] [place], [place] vs discharged.    - Serial hgb with transfusion if hgb below 7  - Serial BMP  - Serial BP monitoring  - Monitor O2 sats, low threshold for CXR to assess for pleural effusion  - Encourage PO fluids, IV LR fluids PRN  - Clear liquid diet  - Strict I/O; monitor for decrease in UOP  - Standard pain meds PRN  - Antiemetics PRN  - Bowel regimen  - SCDs for DVT ppx, no medical anticoagulation at this time    Discussed with Dr. Givens.    Juan Garcia MD MPH  OB/Gyn PGY-2  10/24/20  5:36 PM    The patient was reviewed with Dr. Garcia as well as Dr. Miles.  I have reviewed her labs, VS and imaging.  Her imaging and labs are most c/w bleeding from her ovaries post oocyte retrieval.  Agree with plan for close observation, PRBC transfusion.  If she becomes unstable then would need surgical intervention.     Delmy Givens MD, FACOG

## 2020-10-24 NOTE — ED PROVIDER NOTES
ED Provider Note  [facility]      History     Chief Complaint   Patient presents with     Abdominal Pain     Bloated     HPI  Soledad Botello is a 39 year old female who presents feeling ill after egg retrieval yesterday at around 11 AM.   states that she reports that she has not felt well really for 2 or 3 days.  The patient states that she has had primarily left-sided abdominal discomfort.  Today she is just felt very fatigued.  She had her egg retrieval this morning, and her  reports that they retrieved over 30 eggs, and the clinic ([facility]) expressed concern for possible ovarian hyperstimulation syndrome.  She was encouraged to be cautious and keep them updated if she had any change in her symptoms.  She notes she has not urinated since the procedure.  She states that her discomfort increased, and she additionally developed bilateral shoulder pain tonight, much worse with any deep breathing.  Pain is worse when she lies flat.  No vaginal bleeding since the procedure.  No fever, cough, sore throat, vomiting, diarrhea.  She also felt very lightheaded tonight, laid down on the floor, but her  says she did not pass out.    Past Medical History  Past Medical History:   Diagnosis Date     ADD (attention deficit disorder)      Past Surgical History:   Procedure Laterality Date     BREAST SURGERY      augmentation     ENT SURGERY      tonsillectomy     wisdom teeth            cabergoline (DOSTINEX) 0.5 MG tablet       doxycycline hyclate (VIBRAMYCIN) 100 MG capsule       oxyCODONE-acetaminophen (PERCOCET) 5-325 MG tablet       Amphetamine-Dextroamphetamine (ADDERALL PO)       Ibuprofen (ADVIL PO)       pseudoePHEDrine (SUDAFED) 30 MG tablet       VITAMIN D PO      No Known Allergies  Family History  No family history on file.  Social History   Social History     Tobacco Use     Smoking status: Never Smoker     Smokeless tobacco: Never Used   Substance Use Topics      Alcohol use: Yes     Alcohol/week: 8.0 standard drinks     Types: 4 Glasses of wine, 4 Cans of beer per week     Drug use: No      Past medical history, past surgical history, medications, allergies, family history, and social history were reviewed with the patient. No additional pertinent items.       Review of Systems   All other systems reviewed and are negative.    A complete review of systems was performed with pertinent positives and negatives noted in the HPI, and all other systems negative.    Physical Exam   BP: (!) 72/49  Pulse: 71  Temp: 97.4  F (36.3  C)  Resp: 16  Weight: 61.2 kg (135 lb)  SpO2: 98 %  Physical Exam  Constitutional:       General: She is in acute distress (Ill-appearing, pale).      Appearance: She is not diaphoretic.   HENT:      Head: Atraumatic.   Eyes:      General: No scleral icterus.     Pupils: Pupils are equal, round, and reactive to light.   Cardiovascular:      Heart sounds: Normal heart sounds.   Pulmonary:      Effort: No respiratory distress.      Breath sounds: Normal breath sounds.   Abdominal:      Palpations: Abdomen is soft.      Tenderness: There is abdominal tenderness (Diffuse tenderness with guarding). There is guarding.   Musculoskeletal:         General: No tenderness.   Skin:     General: Skin is warm.      Coloration: Skin is pale.      Findings: No rash.         ED Course      Procedures             EKG Interpretation:      Interpreted by Shara Aguirre MD  Time reviewed: 0210  Symptoms at time of EKG: bilateral shoulder pain  Rhythm: normal sinus   Rate: normal  Axis: normal  Ectopy: none  Conduction: normal  ST Segments/ T Waves: No ST-T wave changes  Q Waves: none  Comparison to prior: No old EKG available    Clinical Impression: normal EKG             Critical Care Addendum    My initial assessment, based on my review of nursing observations, review of vital signs, focused history, physical exam, review of cardiac rhythm monitor and discussion with  gynecology, established that Soledad Botello has and  hypotension, which requires immediate intervention, and therefore she is critically ill.     After the initial assessment, the care team initiated multiple lab tests, initiated IV fluid administration and consulted with gyneocology to provide stabilization care. Due to the critical nature of this patient, I reassessed nursing observations, vital signs, physical exam and mental status multiple times prior to her disposition.     Time also spent performing documentation, reviewing test results, discussion with consultants and coordination of care.     Critical care time (excluding teaching time and procedures): 30 minutes.            Results for orders placed or performed during the hospital encounter of 10/24/20   CT Abdomen Pelvis w Contrast     Status: None    Narrative    EXAM: CT ABDOMEN PELVIS W CONTRAST  LOCATION: [facility]  DATE/TIME: 10/24/2020 3:27 AM    INDICATION: Abdominal pain. Recent egg retrieval procedure.    COMPARISON: None.    TECHNIQUE: CT scan of the abdomen and pelvis was performed following injection of IV contrast. Multiplanar reformats were obtained. Dose reduction techniques were used.    CONTRAST: Iopamidol (Isovue-370) solution 82 mL.       FINDINGS:   LOWER CHEST: Mild subpleural atelectasis in the lung bases.    HEPATOBILIARY: Normal.    PANCREAS: Normal.    SPLEEN: Normal.    ADRENAL GLANDS: Normal.    KIDNEYS/BLADDER: 4 mm nonobstructing stone right kidney. Kidneys are otherwise negative. No hydronephrosis. Bonds catheter in the bladder.    BOWEL: Bowel is normal in caliber with no evidence for obstruction. Negative for appendicitis. Large amount of stool in the colon.    LYMPH NODES: Normal.    VASCULATURE: Unremarkable.    PELVIC ORGANS: Ovaries are markedly enlarged bilaterally with poorly defined margins. Numerous cysts are seen throughout both ovaries with findings compatible with ovarian hyperstimulation. For  example, the left ovary is suspected to measure up to at   least 9 cm in diameter. Ill-defined areas of heterogeneous density within the pelvis. Some of this may relate to prominent ovarian stroma. However, there is also concern for surrounding areas of hemorrhage including some high-density material layering   within fluid in the right side of the pelvis posteriorly on series 5 image 360 as well as in the lower abdomen anteriorly just anterior and superior to the uterus, for example on image 343. Findings could be seen with ruptured hemorrhagic ovarian   follicles. In addition, there is a moderate amount of ascites in the abdomen and pelvis extending all of the way to the upper abdomen and surrounding the liver and spleen. This could also relate to ruptured ovarian cysts and clinical correlation is   needed.    MUSCULOSKELETAL: Normal.      Impression    IMPRESSION:   1.  Both ovaries are markedly enlarged and somewhat ill-defined with numerous cysts. Findings are compatible with ovarian hyperstimulation syndrome.    2.  Ill-defined areas of heterogeneity within and around the ovaries and the pelvis. While some of this may be due to ovarian stroma, there is also concern for surrounding hemorrhage with some high-density material in the dependent portion of the pelvis   as well as in the lower abdomen anteriorly just above the uterus. Findings likely reflect ruptured hemorrhagic ovarian cysts.    3.  In addition there is a moderate amount of ascites in the abdomen and pelvis, including in the upper abdomen around the spleen and liver. This most likely relates to ruptured ovarian cysts and clinical correlation is needed.    4.  No acute bowel findings. Large amount of stool in the colon.    5.  4 mm nonobstructing stone right kidney.     CBC with platelets differential     Status: Abnormal   Result Value Ref Range    WBC 18.2 (H) 4.0 - 11.0 10e9/L    RBC Count 3.24 (L) 3.8 - 5.2 10e12/L    Hemoglobin 10.3 (L) 11.7 -  15.7 g/dL    Hematocrit 31.4 (L) 35.0 - 47.0 %    MCV 97 78 - 100 fl    MCH 31.8 26.5 - 33.0 pg    MCHC 32.8 31.5 - 36.5 g/dL    RDW 11.9 10.0 - 15.0 %    Platelet Count 229 150 - 450 10e9/L    Diff Method Automated Method     % Neutrophils 80.1 %    % Lymphocytes 10.8 %    % Monocytes 7.8 %    % Eosinophils 0.4 %    % Basophils 0.2 %    % Immature Granulocytes 0.7 %    Nucleated RBCs 0 0 /100    Absolute Neutrophil 14.6 (H) 1.6 - 8.3 10e9/L    Absolute Lymphocytes 2.0 0.8 - 5.3 10e9/L    Absolute Monocytes 1.4 (H) 0.0 - 1.3 10e9/L    Absolute Eosinophils 0.1 0.0 - 0.7 10e9/L    Absolute Basophils 0.0 0.0 - 0.2 10e9/L    Abs Immature Granulocytes 0.1 0 - 0.4 10e9/L    Absolute Nucleated RBC 0.0    Comprehensive metabolic panel     Status: Abnormal   Result Value Ref Range    Sodium 138 133 - 144 mmol/L    Potassium 3.6 3.4 - 5.3 mmol/L    Chloride 106 94 - 109 mmol/L    Carbon Dioxide 26 20 - 32 mmol/L    Anion Gap 6 3 - 14 mmol/L    Glucose 123 (H) 70 - 99 mg/dL    Urea Nitrogen 16 7 - 30 mg/dL    Creatinine 0.85 0.52 - 1.04 mg/dL    GFR Estimate 87 >60 mL/min/[1.73_m2]    GFR Estimate If Black >90 >60 mL/min/[1.73_m2]    Calcium 8.4 (L) 8.5 - 10.1 mg/dL    Bilirubin Total 0.2 0.2 - 1.3 mg/dL    Albumin 3.5 3.4 - 5.0 g/dL    Protein Total 6.3 (L) 6.8 - 8.8 g/dL    Alkaline Phosphatase 45 40 - 150 U/L    ALT 47 0 - 50 U/L    AST 24 0 - 45 U/L   Lactic acid     Status: Abnormal   Result Value Ref Range    Lactic Acid 0.6 (L) 0.7 - 2.0 mmol/L   Bilirubin direct     Status: None   Result Value Ref Range    Bilirubin Direct <0.1 0.0 - 0.2 mg/dL   UA with Microscopic     Status: Abnormal   Result Value Ref Range    Color Urine Yellow     Appearance Urine Clear     Glucose Urine Negative NEG^Negative mg/dL    Bilirubin Urine Negative NEG^Negative    Ketones Urine Negative NEG^Negative mg/dL    Specific Gravity Urine 1.028 1.003 - 1.035    Blood Urine Small (A) NEG^Negative    pH Urine 5.5 5.0 - 7.0 pH    Protein Albumin  Urine 10 (A) NEG^Negative mg/dL    Urobilinogen mg/dL Normal 0.0 - 2.0 mg/dL    Nitrite Urine Negative NEG^Negative    Leukocyte Esterase Urine Negative NEG^Negative    Source Catheterized Urine     WBC Urine 0 0 - 5 /HPF    RBC Urine 2 0 - 2 /HPF   EKG 12 lead     Status: None (Preliminary result)   Result Value Ref Range    Interpretation ECG Click View Image link to view waveform and result    hCG qual urine POCT     Status: Normal   Result Value Ref Range    HCG Qual Urine Negative neg    Internal QC OK Yes    ABO/Rh type and screen     Status: None (In process)   Result Value Ref Range    ABO PENDING     Antibody Screen PENDING     Test Valid Only At          [facility]    Specimen Expires 10/27/2020      Medications   0.9% sodium chloride BOLUS (1,000 mLs Intravenous New Bag 10/24/20 0534)   0.9% sodium chloride BOLUS (0 mLs Intravenous Stopped 10/24/20 0240)   iopamidol (ISOVUE-370) solution 82 mL (82 mLs Intravenous Given 10/24/20 0322)   sodium chloride (PF) 0.9% PF flush 71 mL (71 mLs Intravenous Given 10/24/20 0323)        Assessments & Plan (with Medical Decision Making)   The patient was toxic appearing upon arrival.  She was pale, did not look good.  Initial blood pressures were low.  2 IVs were started, she was given a bolus of fluid, blood pressures did come up into the 90s.  Looks like her baseline blood pressure is likely in the low 100s.  History and exam were very concerning for the possibility of ovarian hyperstimulation syndrome.  Gynecology was consulted, did see the patient.  CT was done, which is also concerning for this diagnosis.  She does have markedly enlarged ovaries.  There is small amount of blood noted, I suspect secondary to the egg harvest.  She also does have moderate ascites.  Lungs look clear and x-ray, though official read is still pending.  She will be admitted to gynecology at this time for further close monitoring and management.   White blood cell count is high at this time, likely reactive, without any evidence for infection.    Dictation Disclaimer: Some of this Note has been completed with voice-recognition dictation software. Although errors are generally corrected real-time, there is the potential for a rare error to be present in the completed chart.      I have reviewed the nursing notes. I have reviewed the findings, diagnosis, plan and need for follow up with the patient.    New Prescriptions    No medications on file       Final diagnoses:   Ovarian hyperstimulation syndrome       --  Shara Aguirre  [facility] EMERGENCY DEPARTMENT  10/24/2020     Shara Aguirre MD  10/24/20 0657

## 2020-10-24 NOTE — H&P
History and Physical   October 24, 2020  Soledad Botello  [id number]      HPI: Soledad Botello is a 39 year old P0 POD#1 s/p egg retrieval at [facility] who presents complaining of bilateral shoulder pain and significant bloating. She says that she had been feeling bloated and generally unwell prior to her egg retrieval this morning. After her egg retrieval, she felt slightly better but then started feeling worse over the course of yesterday. She has been unable to take deep breaths due to the shoulder pain but denies any specific chest pain. She feels significantly bloated/pressure and has noticed an increase in her abdominal size. She says that she weighed herself twice yesterday and noticed a 6.5 lbs weight gain throughout the day. She was told that she is at high risk for OHSS given that they retrieved almost 30 eggs from her left ovary. At approximately 9 PM last night, she felt dizzy and almost lost consciousness. She has been feeling intermittent nausea but no episodes of emesis. Also has not urinated since her procedure this morning despite eating and drinking.     ROS: No headaches,vomiting, fevers, chills, chest pain, abdominal pain, dizziness currently      OBHX:   OB History   No obstetric history on file.     Sees Dr. Fang Miles for JENNIFER and receives OBGYN care at [facility]    MedicalHX:   Past Medical History:   Diagnosis Date     ADD (attention deficit disorder)    Infertility   Oral HSV    SurgicalHX:   Past Surgical History:   Procedure Laterality Date     BREAST SURGERY      augmentation     ENT SURGERY      tonsillectomy     wisdom teeth         Medications:   No current facility-administered medications on file prior to encounter.        cabergoline (DOSTINEX) 0.5 MG tablet, Take 0.25 mg by mouth daily (with dinner)       doxycycline hyclate (VIBRAMYCIN) 100 MG capsule, Take 100 mg by mouth 2 times daily       oxyCODONE-acetaminophen (PERCOCET) 5-325 MG tablet, Take 1 tablet  by mouth every 6 hours as needed for breakthrough pain or severe pain       Amphetamine-Dextroamphetamine (ADDERALL PO),        Ibuprofen (ADVIL PO),        pseudoePHEDrine (SUDAFED) 30 MG tablet, Take by mouth every 4 hours as needed for congestion       VITAMIN D PO,         Allergies:  No Known Allergies    FamilyHX:  No family history on file.  Denies bleeding or clotting disorders in the family    SocialHX:   Occasional drinking; no smoking, drug use    ROS: 10-point ROS negative except as indicated in HPI.    Physical Exam:  Vitals:    10/24/20 0256 10/24/20 0330 10/24/20 0331 10/24/20 0332   BP:  98/62     Pulse: 65 80 84 80   Resp: 13 17 22 19   Temp:       TempSrc:       SpO2: 100% 99% 98% 100%   Weight:         General: alert, oriented female, resting in bed  CV: regular rate and rhythm  Lungs: clear bilaterally though taking shallow breaths, no crackles or wheezes  Abdomen: soft, moderately tender with palpation, no rebound or guarding, small distension, dull with percussion, no fluid wave visualized   Extremities: bilateral lower extremities non-tender with trace edema    UOP: 200 mL on bladder scan    Labs:     Last Comprehensive Metabolic Panel:  Sodium   Date Value Ref Range Status   10/24/2020 138 133 - 144 mmol/L Final     Potassium   Date Value Ref Range Status   10/24/2020 3.6 3.4 - 5.3 mmol/L Final     Chloride   Date Value Ref Range Status   10/24/2020 106 94 - 109 mmol/L Final     Carbon Dioxide   Date Value Ref Range Status   10/24/2020 26 20 - 32 mmol/L Final     Anion Gap   Date Value Ref Range Status   10/24/2020 6 3 - 14 mmol/L Final     Glucose   Date Value Ref Range Status   10/24/2020 123 (H) 70 - 99 mg/dL Final     Urea Nitrogen   Date Value Ref Range Status   10/24/2020 16 7 - 30 mg/dL Final     Creatinine   Date Value Ref Range Status   10/24/2020 0.85 0.52 - 1.04 mg/dL Final     GFR Estimate   Date Value Ref Range Status   10/24/2020 87 >60 mL/min/[1.73_m2] Final     Comment:      Non  GFR Calc  Starting 12/18/2018, serum creatinine based estimated GFR (eGFR) will be   calculated using the Chronic Kidney Disease Epidemiology Collaboration   (CKD-EPI) equation.       Calcium   Date Value Ref Range Status   10/24/2020 8.4 (L) 8.5 - 10.1 mg/dL Final     Bilirubin Total   Date Value Ref Range Status   10/24/2020 0.2 0.2 - 1.3 mg/dL Final     Alkaline Phosphatase   Date Value Ref Range Status   10/24/2020 45 40 - 150 U/L Final     ALT   Date Value Ref Range Status   10/24/2020 47 0 - 50 U/L Final     AST   Date Value Ref Range Status   10/24/2020 24 0 - 45 U/L Final     CBC RESULTS:   Recent Labs   Lab Test 10/24/20  0203   WBC 18.2*   RBC 3.24*   HGB 10.3*   HCT 31.4*   MCV 97   MCH 31.8   MCHC 32.8   RDW 11.9        UA RESULTS:  Recent Labs   Lab Test 10/24/20  0241   COLOR Yellow   APPEARANCE Clear   URINEGLC Negative   URINEBILI Negative   URINEKETONE Negative   SG 1.028   UBLD Small*   URINEPH 5.5   PROTEIN 10*   NITRITE Negative   LEUKEST Negative   RBCU 2   WBCU 0       Imaging:  CT A/P IMPRESSION:   1.  Both ovaries are markedly enlarged and somewhat ill-defined with numerous cysts. Findings are compatible with ovarian hyperstimulation syndrome.  2.  Ill-defined areas of heterogeneity within and around the ovaries and the pelvis. While some of this may be due to ovarian stroma, there is also concern for surrounding hemorrhage with some high-density material in the dependent portion of the pelvis   as well as in the lower abdomen anteriorly just above the uterus. Findings likely reflect ruptured hemorrhagic ovarian cysts.  3.  In addition there is a moderate amount of ascites in the abdomen and pelvis, including in the upper abdomen around the spleen and liver. This most likely relates to ruptured ovarian cysts and clinical correlation is needed.  4.  No acute bowel findings. Large amount of stool in the colon.  5.  4 mm nonobstructing stone right  kidney.       Assessment: 39 year old G0 POD#1 s/p egg retrieval at [facility] presenting to the ED with near syncopal episode, bilateral shoulder pain, bloating, and 6.5 lbs weight gain. Symptoms consistent with ovarian hyperstimulation syndrome (OHSS) vs ovarian bleeding from her oocyte retrieval. Patient was initially hypotensive upon presentation to the ED but BP have normalized after IV fluids. Vitals signs are otherwise within normal limits. CBC notable for leukocytosis of 18.2, hemoconcentration with hematocrit of 31.4. Hemoglobin 10.3; no records of previous hemoglobin. Therefore, unable to rule out acute blood loss anemia. CMP within normal limits with creatinine 0.85. Patient overall appears to be stable, not needing an ICU admission though will likely need transfer to the [facility] for further management.     Plan:    # OHSS vs ovarian cyst rupture  - CBC remarkable for leukocytosis and hemoconcentration (see labs above); Hgb 10.3 (no previous records, unable to rule out acute blood loss anemia)  - CT abdomen/pelvis shows moderate abdominal ascites with possible hemorrhagic cyst. Chest xray appears normal- final read pending  - treatment for OHSS is typically symptomatic management. Patients generally have spontaneous resolution of extravascular fluid extravasation over the course of 10-14 days and ultimately diurese extra fluid  - do not suspect that patient currently needs ICU admission; will arrange for transfer to the [facility] for further management  - repeat labs in the AM; continue serial vital signs and monitor UOP closely. Bonds in place.     Will staff with Dr. Piedra once team is out of the OR    Eliseo Chun MD  OB/GYN PGY-2  10/24/20 3:47 AM    The patient was reviewed with Dr. Chun.  I agree with the above assessment and plan of care.    Delmy Givens MD, FACOG

## 2020-10-24 NOTE — ED NOTES
Sign out Provider: Carla    Sign out Plan: 39-year-old female currently undergoing preparation for IVF who presents with abdominal pain and bloating consistent with ovarian hyperstimulation syndrome.  Initially hypotensive which improved with IV fluids.  Currently awaiting admission to gynecology on the [facility].    Reassessment: Patient's blood pressure is did trend back down to 86/50.  She was given a third L of normal saline and hemoglobin was rechecked.  Hemoglobin dropped to 8.3 from 10.3 which may be partially due to dilution as her WBC has dropped as well.  Spoke with gyn onc and will plan to admit the patient to the [place] ICU.      I did also contact the patient's JENNIFER physician Dr. Miles.  She reports the patient received a Lupron only injection and is at low risk for ovarian hyperstimulation syndrome however may have hypotension secondary to bleeding.  Her initial hemoglobin prior to the retrieval was 13.  In addition she was given albumin for ongoing hypotension.      During her ED stay her pressures did continue to remain soft.  Repeat hemoglobin now shows a hemoglobin of 7.2.  Discussed with gyn onc.  She was given 1 unit PRBCs.  On bedside ultrasound she does not have a large amount of ascites that would be amenable to aspiration at the bedside.  Currently awaiting ICU placement.      Disposition: Admit ICU           Blank Koroma MD  10/24/20 2267

## 2020-10-24 NOTE — ED NOTES
[facility]    ED Nurse to Floor Handoff     Soledad Botello is a 39 year old female who speaks English and lives with a spouse,  in a home  They arrived in the ED by car from home    ED Chief Complaint: Abdominal Pain and Bloated    ED Dx;   Final diagnoses:   Ovarian hyperstimulation syndrome         Needed?: No    Allergies: No Known Allergies.  Past Medical Hx:   Past Medical History:   Diagnosis Date     ADD (attention deficit disorder)       Baseline Mental status: WDL  Current Mental Status changes: at basesline    Infection present or suspected this encounter: no  Sepsis suspected: No  Isolation type: No active isolations  Patient tested for COVID 19 prior to admission: NO     Activity level - Baseline/Home:  Independent  Activity Level - Current:   Stand with Assist    Bariatric equipment needed?: No    In the ED these meds were given:   Medications   0.9% sodium chloride BOLUS (1,000 mLs Intravenous New Bag 10/24/20 0534)   0.9% sodium chloride BOLUS (0 mLs Intravenous Stopped 10/24/20 0240)   iopamidol (ISOVUE-370) solution 82 mL (82 mLs Intravenous Given 10/24/20 0322)   sodium chloride (PF) 0.9% PF flush 71 mL (71 mLs Intravenous Given 10/24/20 0323)       Drips running?  No    Home pump  No    Current LDAs  Peripheral IV 10/24/20 Right Upper forearm (Active)   Site Assessment WDL 10/24/20 0217   Number of days: 0       Peripheral IV 10/24/20 Right Upper forearm (Active)   Site Assessment WDL 10/24/20 0244   Line Status Saline locked 10/24/20 0244   Dressing Intervention New dressing  10/24/20 0244   Phlebitis Scale 0-->no symptoms 10/24/20 0244   Infiltration Scale 0 10/24/20 0244   Infiltration Site Treatment Method  None 10/24/20 0244   If infiltrated, was a Vesicant infusing? No 10/24/20 0244   Number of days: 0       Urethral Catheter Double-lumen 16 fr (Active)   Collection Container Leg bag 10/24/20 0243   Securement Method Tape 10/24/20  0229   Number of days: 0       Labs results:   Labs Ordered and Resulted from Time of ED Arrival Up to the Time of Departure from the ED   CBC WITH PLATELETS DIFFERENTIAL - Abnormal; Notable for the following components:       Result Value    WBC 18.2 (*)     RBC Count 3.24 (*)     Hemoglobin 10.3 (*)     Hematocrit 31.4 (*)     Absolute Neutrophil 14.6 (*)     Absolute Monocytes 1.4 (*)     All other components within normal limits   COMPREHENSIVE METABOLIC PANEL - Abnormal; Notable for the following components:    Glucose 123 (*)     Calcium 8.4 (*)     Protein Total 6.3 (*)     All other components within normal limits   LACTIC ACID WHOLE BLOOD - Abnormal; Notable for the following components:    Lactic Acid 0.6 (*)     All other components within normal limits   ROUTINE UA WITH MICROSCOPIC - Abnormal; Notable for the following components:    Blood Urine Small (*)     Protein Albumin Urine 10 (*)     All other components within normal limits   HCG QUAL URINE POCT - Normal   BILIRUBIN DIRECT   BLADDER SCAN   IP ASSIGN PROVIDER TEAM TO TREATMENT TEAM   ABO/RH TYPE AND SCREEN       Imaging Studies:   Recent Results (from the past 24 hour(s))   CT Abdomen Pelvis w Contrast    Narrative    EXAM: CT ABDOMEN PELVIS W CONTRAST  LOCATION: [facility]  DATE/TIME: 10/24/2020 3:27 AM    INDICATION: Abdominal pain. Recent egg retrieval procedure.    COMPARISON: None.    TECHNIQUE: CT scan of the abdomen and pelvis was performed following injection of IV contrast. Multiplanar reformats were obtained. Dose reduction techniques were used.    CONTRAST: Iopamidol (Isovue-370) solution 82 mL.       FINDINGS:   LOWER CHEST: Mild subpleural atelectasis in the lung bases.    HEPATOBILIARY: Normal.    PANCREAS: Normal.    SPLEEN: Normal.    ADRENAL GLANDS: Normal.    KIDNEYS/BLADDER: 4 mm nonobstructing stone right kidney. Kidneys are otherwise negative. No hydronephrosis. Bonds catheter in the bladder.    BOWEL: Bowel is  normal in caliber with no evidence for obstruction. Negative for appendicitis. Large amount of stool in the colon.    LYMPH NODES: Normal.    VASCULATURE: Unremarkable.    PELVIC ORGANS: Ovaries are markedly enlarged bilaterally with poorly defined margins. Numerous cysts are seen throughout both ovaries with findings compatible with ovarian hyperstimulation. For example, the left ovary is suspected to measure up to at   least 9 cm in diameter. Ill-defined areas of heterogeneous density within the pelvis. Some of this may relate to prominent ovarian stroma. However, there is also concern for surrounding areas of hemorrhage including some high-density material layering   within fluid in the right side of the pelvis posteriorly on series 5 image 360 as well as in the lower abdomen anteriorly just anterior and superior to the uterus, for example on image 343. Findings could be seen with ruptured hemorrhagic ovarian   follicles. In addition, there is a moderate amount of ascites in the abdomen and pelvis extending all of the way to the upper abdomen and surrounding the liver and spleen. This could also relate to ruptured ovarian cysts and clinical correlation is   needed.    MUSCULOSKELETAL: Normal.      Impression    IMPRESSION:   1.  Both ovaries are markedly enlarged and somewhat ill-defined with numerous cysts. Findings are compatible with ovarian hyperstimulation syndrome.    2.  Ill-defined areas of heterogeneity within and around the ovaries and the pelvis. While some of this may be due to ovarian stroma, there is also concern for surrounding hemorrhage with some high-density material in the dependent portion of the pelvis   as well as in the lower abdomen anteriorly just above the uterus. Findings likely reflect ruptured hemorrhagic ovarian cysts.    3.  In addition there is a moderate amount of ascites in the abdomen and pelvis, including in the upper abdomen around the spleen and liver. This most likely  relates to ruptured ovarian cysts and clinical correlation is needed.    4.  No acute bowel findings. Large amount of stool in the colon.    5.  4 mm nonobstructing stone right kidney.         Recent vital signs:   BP 92/56   Pulse 78   Temp 97.9  F (36.6  C) (Oral)   Resp 16   Wt 61.2 kg (135 lb)   SpO2 98%   BMI 21.14 kg/m      [place] Coma Scale Score: 15 (10/24/20 0227)       Cardiac Rhythm: Normal Sinus  Pt needs tele? Yes  Skin/wound Issues: None    Code Status: Full Code    Pain control: good    Nausea control: good    Abnormal labs/tests/findings requiring intervention:     Family present during ED course? Yes   Family Comments/Social Situation comments:     Tasks needing completion: None    Carol Mike RN  [phone] [facility]  [phone] [facility]

## 2020-10-25 ENCOUNTER — PATIENT OUTREACH (OUTPATIENT)
Dept: CARE COORDINATION | Facility: CLINIC | Age: 40
End: 2020-10-25

## 2020-10-25 VITALS
OXYGEN SATURATION: 95 % | RESPIRATION RATE: 18 BRPM | WEIGHT: 135 LBS | DIASTOLIC BLOOD PRESSURE: 63 MMHG | TEMPERATURE: 98.8 F | BODY MASS INDEX: 21.14 KG/M2 | HEART RATE: 77 BPM | SYSTOLIC BLOOD PRESSURE: 96 MMHG

## 2020-10-25 LAB
ANION GAP SERPL CALCULATED.3IONS-SCNC: 4 MMOL/L (ref 3–14)
ANION GAP SERPL CALCULATED.3IONS-SCNC: 5 MMOL/L (ref 3–14)
BUN SERPL-MCNC: 5 MG/DL (ref 7–30)
BUN SERPL-MCNC: 6 MG/DL (ref 7–30)
CALCIUM SERPL-MCNC: 8.2 MG/DL (ref 8.5–10.1)
CALCIUM SERPL-MCNC: 8.6 MG/DL (ref 8.5–10.1)
CHLORIDE SERPL-SCNC: 107 MMOL/L (ref 94–109)
CHLORIDE SERPL-SCNC: 109 MMOL/L (ref 94–109)
CO2 SERPL-SCNC: 26 MMOL/L (ref 20–32)
CO2 SERPL-SCNC: 28 MMOL/L (ref 20–32)
CREAT SERPL-MCNC: 0.72 MG/DL (ref 0.52–1.04)
CREAT SERPL-MCNC: 0.78 MG/DL (ref 0.52–1.04)
ERYTHROCYTE [DISTWIDTH] IN BLOOD BY AUTOMATED COUNT: 12.9 % (ref 10–15)
GFR SERPL CREATININE-BSD FRML MDRD: >90 ML/MIN/{1.73_M2}
GFR SERPL CREATININE-BSD FRML MDRD: >90 ML/MIN/{1.73_M2}
GLUCOSE SERPL-MCNC: 91 MG/DL (ref 70–99)
GLUCOSE SERPL-MCNC: 92 MG/DL (ref 70–99)
HCT VFR BLD AUTO: 26.4 % (ref 35–47)
HGB BLD-MCNC: 8.5 G/DL (ref 11.7–15.7)
HGB BLD-MCNC: 9.2 G/DL (ref 11.7–15.7)
MAGNESIUM SERPL-MCNC: 1.9 MG/DL (ref 1.6–2.3)
MCH RBC QN AUTO: 31.3 PG (ref 26.5–33)
MCHC RBC AUTO-ENTMCNC: 32.2 G/DL (ref 31.5–36.5)
MCV RBC AUTO: 97 FL (ref 78–100)
PHOSPHATE SERPL-MCNC: 2.7 MG/DL (ref 2.5–4.5)
PLATELET # BLD AUTO: 151 10E9/L (ref 150–450)
POTASSIUM SERPL-SCNC: 3.8 MMOL/L (ref 3.4–5.3)
POTASSIUM SERPL-SCNC: 3.9 MMOL/L (ref 3.4–5.3)
RBC # BLD AUTO: 2.72 10E12/L (ref 3.8–5.2)
SARS-COV-2 RNA SPEC QL NAA+PROBE: NOT DETECTED
SODIUM SERPL-SCNC: 139 MMOL/L (ref 133–144)
SODIUM SERPL-SCNC: 140 MMOL/L (ref 133–144)
SPECIMEN SOURCE: NORMAL
WBC # BLD AUTO: 8.6 10E9/L (ref 4–11)

## 2020-10-25 PROCEDURE — 36415 COLL VENOUS BLD VENIPUNCTURE: CPT | Performed by: STUDENT IN AN ORGANIZED HEALTH CARE EDUCATION/TRAINING PROGRAM

## 2020-10-25 PROCEDURE — 99233 SBSQ HOSP IP/OBS HIGH 50: CPT | Performed by: ANESTHESIOLOGY

## 2020-10-25 PROCEDURE — 80048 BASIC METABOLIC PNL TOTAL CA: CPT | Performed by: STUDENT IN AN ORGANIZED HEALTH CARE EDUCATION/TRAINING PROGRAM

## 2020-10-25 PROCEDURE — 80048 BASIC METABOLIC PNL TOTAL CA: CPT | Performed by: NURSE PRACTITIONER

## 2020-10-25 PROCEDURE — 36415 COLL VENOUS BLD VENIPUNCTURE: CPT | Performed by: NURSE PRACTITIONER

## 2020-10-25 PROCEDURE — 250N000011 HC RX IP 250 OP 636: Performed by: STUDENT IN AN ORGANIZED HEALTH CARE EDUCATION/TRAINING PROGRAM

## 2020-10-25 PROCEDURE — 85018 HEMOGLOBIN: CPT | Performed by: STUDENT IN AN ORGANIZED HEALTH CARE EDUCATION/TRAINING PROGRAM

## 2020-10-25 PROCEDURE — 250N000011 HC RX IP 250 OP 636: Performed by: NURSE PRACTITIONER

## 2020-10-25 PROCEDURE — 250N000013 HC RX MED GY IP 250 OP 250 PS 637: Performed by: STUDENT IN AN ORGANIZED HEALTH CARE EDUCATION/TRAINING PROGRAM

## 2020-10-25 PROCEDURE — 84100 ASSAY OF PHOSPHORUS: CPT | Performed by: NURSE PRACTITIONER

## 2020-10-25 PROCEDURE — 83735 ASSAY OF MAGNESIUM: CPT | Performed by: NURSE PRACTITIONER

## 2020-10-25 PROCEDURE — 250N000013 HC RX MED GY IP 250 OP 250 PS 637: Performed by: NURSE PRACTITIONER

## 2020-10-25 PROCEDURE — 85027 COMPLETE CBC AUTOMATED: CPT | Performed by: NURSE PRACTITIONER

## 2020-10-25 RX ORDER — HYDROMORPHONE HYDROCHLORIDE 2 MG/1
2 TABLET ORAL EVERY 4 HOURS PRN
Status: DISCONTINUED | OUTPATIENT
Start: 2020-10-25 | End: 2020-10-25

## 2020-10-25 RX ORDER — HYDROMORPHONE HYDROCHLORIDE 4 MG/1
4 TABLET ORAL EVERY 6 HOURS PRN
Qty: 20 TABLET | Refills: 0 | Status: SHIPPED | OUTPATIENT
Start: 2020-10-25 | End: 2020-10-30

## 2020-10-25 RX ORDER — HYDROMORPHONE HYDROCHLORIDE 2 MG/1
2-4 TABLET ORAL EVERY 4 HOURS PRN
Status: DISCONTINUED | OUTPATIENT
Start: 2020-10-25 | End: 2020-10-25 | Stop reason: HOSPADM

## 2020-10-25 RX ORDER — FUROSEMIDE 10 MG/ML
10 INJECTION INTRAMUSCULAR; INTRAVENOUS ONCE
Status: COMPLETED | OUTPATIENT
Start: 2020-10-25 | End: 2020-10-25

## 2020-10-25 RX ORDER — OXYCODONE AND ACETAMINOPHEN 5; 325 MG/1; MG/1
1 TABLET ORAL EVERY 6 HOURS PRN
Qty: 20 TABLET | Refills: 0 | Status: SHIPPED | OUTPATIENT
Start: 2020-10-25 | End: 2020-10-25

## 2020-10-25 RX ORDER — METHOCARBAMOL 500 MG/1
500 TABLET, FILM COATED ORAL 4 TIMES DAILY PRN
Status: DISCONTINUED | OUTPATIENT
Start: 2020-10-25 | End: 2020-10-25 | Stop reason: HOSPADM

## 2020-10-25 RX ORDER — BISACODYL 10 MG
10 SUPPOSITORY, RECTAL RECTAL DAILY PRN
Status: DISCONTINUED | OUTPATIENT
Start: 2020-10-25 | End: 2020-10-25 | Stop reason: HOSPADM

## 2020-10-25 RX ADMIN — POLYETHYLENE GLYCOL 3350 17 G: 17 POWDER, FOR SOLUTION ORAL at 08:17

## 2020-10-25 RX ADMIN — IBUPROFEN 600 MG: 400 TABLET ORAL at 10:54

## 2020-10-25 RX ADMIN — ENOXAPARIN SODIUM 40 MG: 40 INJECTION SUBCUTANEOUS at 10:46

## 2020-10-25 RX ADMIN — HYDROMORPHONE HYDROCHLORIDE 0.2 MG: 0.2 INJECTION, SOLUTION INTRAMUSCULAR; INTRAVENOUS; SUBCUTANEOUS at 04:13

## 2020-10-25 RX ADMIN — ACETAMINOPHEN 975 MG: 325 TABLET, FILM COATED ORAL at 10:46

## 2020-10-25 RX ADMIN — HYDROMORPHONE HYDROCHLORIDE 0.2 MG: 0.2 INJECTION, SOLUTION INTRAMUSCULAR; INTRAVENOUS; SUBCUTANEOUS at 06:45

## 2020-10-25 RX ADMIN — HYDROMORPHONE HYDROCHLORIDE 4 MG: 2 TABLET ORAL at 15:33

## 2020-10-25 RX ADMIN — Medication 1 TABLET: at 08:17

## 2020-10-25 RX ADMIN — MENTHOL 1 PATCH: 205.5 PATCH TOPICAL at 04:13

## 2020-10-25 RX ADMIN — HYDROMORPHONE HYDROCHLORIDE 0.2 MG: 0.2 INJECTION, SOLUTION INTRAMUSCULAR; INTRAVENOUS; SUBCUTANEOUS at 09:24

## 2020-10-25 RX ADMIN — DOCUSATE SODIUM 50 MG AND SENNOSIDES 8.6 MG 2 TABLET: 8.6; 5 TABLET, FILM COATED ORAL at 08:17

## 2020-10-25 RX ADMIN — FUROSEMIDE 10 MG: 10 INJECTION, SOLUTION INTRAMUSCULAR; INTRAVENOUS at 10:46

## 2020-10-25 RX ADMIN — HYDROMORPHONE HYDROCHLORIDE 4 MG: 2 TABLET ORAL at 11:32

## 2020-10-25 RX ADMIN — DOXYCYCLINE HYCLATE 100 MG: 100 CAPSULE ORAL at 08:20

## 2020-10-25 RX ADMIN — DEXTROAMPHETAMINE SACCHARATE, AMPHETAMINE ASPARTATE, DEXTROAMPHETAMINE SULFATE AND AMPHETAMINE SULFATE 10 MG: 2.5; 2.5; 2.5; 2.5 TABLET ORAL at 08:17

## 2020-10-25 RX ADMIN — POTASSIUM CHLORIDE 20 MEQ: 750 TABLET, EXTENDED RELEASE ORAL at 06:39

## 2020-10-25 RX ADMIN — ACETAMINOPHEN 975 MG: 325 TABLET, FILM COATED ORAL at 03:50

## 2020-10-25 RX ADMIN — THERA TABS 1 TABLET: TAB at 08:17

## 2020-10-25 RX ADMIN — HYDROMORPHONE HYDROCHLORIDE 2 MG: 2 TABLET ORAL at 08:17

## 2020-10-25 RX ADMIN — METHOCARBAMOL 500 MG: 500 TABLET, FILM COATED ORAL at 08:17

## 2020-10-25 RX ADMIN — ONDANSETRON 4 MG: 2 INJECTION INTRAMUSCULAR; INTRAVENOUS at 09:24

## 2020-10-25 RX ADMIN — LACTULOSE 20 G: 20 POWDER, FOR SOLUTION ORAL at 11:24

## 2020-10-25 RX ADMIN — MENTHOL 1 PATCH: 205.5 PATCH TOPICAL at 09:43

## 2020-10-25 RX ADMIN — DEXTROAMPHETAMINE SACCHARATE, AMPHETAMINE ASPARTATE, DEXTROAMPHETAMINE SULFATE AND AMPHETAMINE SULFATE 10 MG: 2.5; 2.5; 2.5; 2.5 TABLET ORAL at 14:17

## 2020-10-25 ASSESSMENT — ACTIVITIES OF DAILY LIVING (ADL)
ADLS_ACUITY_SCORE: 14

## 2020-10-25 NOTE — PLAN OF CARE
Major Shift Events: Had a low Bp reading overnight for about an hour that subsided with no s/s. Patient continues to c/o of abdominal and lower back pain; utilizing icy hot and lidocaine patch. Scheduled tylenol administered along with requested prn dilaudid. Was nauseous yesterday evening and had one episode of emesis. Prn Zofran administered. Otherwise has since tolerated clear liquid diet. Has been sleeping most of the night. No major changes to status. Potassium replaced per protocol.  Plan: Q6h hgb check plan; cont to monitor. Calcium lab low; discuss with team if need to be replaced. Plan to possibly transfer to floor.   For vital signs and complete assessments, please see documentation flowsheets.

## 2020-10-25 NOTE — PROGRESS NOTES
SICU Daily Progress Note  [facility] [place]  October 25, 2020    Overnight events   No acute events    Plan by system:  Neuro  #Acute abdominal pain    Pain  ? Tylenol 975 mg q6h scheduled  ? Oxycodone 5-10 mg q4h PRN ==> change to hydromorphone 2-4 mg p.o. q4h PRN  ? Hydromorphone 0.2 mg q2h PRN (used 5 doses, encouraged use of oral)  ? Lidocaine patches to lower abd and back  ? Icy hot patch to lower abd or back when lido patch off   ? Add Robaxin 500 mg qid PRN  ? Ibuprofen 600 mg q6hh PRN, hold if creatinine rising     #ADHD    Continue home adderal 10 mg TID      Respiratory  No acute issues  At risk for developing pleural effusions in the setting of suspected ovarian hyperstimulation syndrome.    RA, non-labored WOB, lungs CTA     Supplemental O2 to keep SpO2 > 92%    Obtain chest xray if new O2 requirements develop or complaints of shortness of breath     Cardiovascular  #Shock, multifactorial - hemorrhagic, hypovolemic  Hemodynamically stable overnight with MAPs > 65 mmHg. No fluids required for hypotension, Hgb stable at 8.5 this AM, no product needed overnight. Perfusion adequate, extremities warm, UOP, and lactate WNL. Pt's abd is distended in the setting of fluid third spacing and there is evidence of lower extremity edema. Will continue to monitor BP and anticipate fluid mobilization in the coming days.     Hemodynamic support, goal MAP > 65 mmHg via NIBP  ? ED IV fluid resuscitation: 3L NS + 0.5L albumin + 1 unit PRBC  ? LA 0.7 on ICU arrival  ? Transfuse for Hgb < 7.0 or hypotension with Hgb < 8.0  ? Bolus with LR PRN if Hgb > 8.0 if hypotensive    Stop trending Hgb, daily CBC while hospitalized     Discontinue telemetry     Gastrointestinal/Nutrition  #Nausea   #Constipation  Continues to have abd distention and constipation. 2 BM overnight, but were small and hard with a large stool burden on CT. Will intensify bowel regimen today.    Diet: Clear liquid, advance to regular today    Last BM 10/25,  regimen  ? Senna-docusate 2 tabs BID scheduled  ? Miralax 17g every day scheduled   ? Lactulose 20 mg q6h until pt has a BM  ? Biscodyl supp x1 today, daily PRN thereafter while hospitalized     Ondansetron 4 mg q6h PRN (1 dose)    Continue prenatal vitamin and calcium carbonate      Gynecologic/Genitourinary:  # Suspected ovarian hyperstimulation syndrome in the setting of recent egg retrieval for IVF  # Suspected hemoperitoneum in the setting of possible ruptured ovarian cyst  # Urinary retention    Gyn-onc following, appreciate recs    Hold PTA desogestrel-esthinyl estradiol to minimize risk for blood clots    Discontinue cabergoline 0.5 mg at bedtime    Zhong for strict I&O, notify SICU for UOP < 0.5 ml/kg/hr    Remove zhong today    Voided post removal    Furosemide 10 mg x1 IV    Cr 0.72, K+ and Na+ WNL    Stop trending BMP, repeat in AM if remains hospitalized     ICU electrolyte replacement protocol         Endocrine  No active issues    Goal -180 for optimal healing    BG 92, no indication for insulin at this time     Infectious Diseases  # Perioperative antibiotics  # Query septic component to her hypotension  Pt stated she did experience rigors last night, but denied fevers or abnormal vaginal discharge. WBC 18.2 on arrival to ED. Will collect blood cultures in the setting of recent invasive procedure.     Cultures  ? Blood cultures x2 10/24 - NGTD  ? Asymptomatic COVID-19 testing per hospital policy - NEG    Antibiotics   ? Continue prophylactic doxycyline 100 mg BID x 3 days      Hematologic  # Risk for hypercoagulability in the setting of potential OHSS    SCDs for DVT ppx    Lovenox 40 mg every day     Hold PTA desogestrel-ethinyl estradiol to minimize hypercoagulability, resume per GYN/Onc team     MSK    No active issues    Mobilize ad ramesh     Lines/ Tubes/ Drains     PIV x2     General cares:  Prophylaxis    DVT: SCDs only    Stress ulcer: no PPI indicated      Disposition    [place]charge to  home [place] [facility] [place] if pt wishes to stay for pain control        Eloina Sainz, CNP   ____________________________________________________________________________    Subjective  Hemodynamically stable overnight, Hgb stable with no need for ongoing blood transfusions. This AM pt states her pain remains severe and IV hydromorphone is more effective than oral oxycodone. Her pain remains in her lower abd and back. Lidocaine patches are minimally effective and icy hot patch seems to help a little more. Pt did have 2 bowel movements, but they were small and hard and she still is very distended. Bonds cathter removed, and pt has been able to void.     Objective   Vitals:    10/25/20 0400 10/25/20 0435 10/25/20 0500 10/25/20 0600   BP: 97/65 94/64 92/62 95/58   BP Location: Left arm      Pulse: 70 90 66 84   Resp: 18 19 15 15   Temp: 98.7  F (37.1  C)      TempSrc: Oral      SpO2: 97% 97% 97% 96%   Weight:           I&O    Intake/Output Summary (Last 24 hours) at 10/25/2020 0718  Last data filed at 10/25/2020 0600  Gross per 24 hour   Intake 910 ml   Output 3695 ml   Net -2785 ml       Physical Exam:  General: Patient seen sitting on the edge of her bed, non-toxic appearing, but does appear to be in pain.  HEENT: Normocephalic, atraumatic, oral mucosa mosit, neck supple  Resp: WOB easy, lung sounds CTA bilaterally and throughout, no rales, rhonchi, or wheezing, no cough  CV: S1S2, no murmurs, rubs, or gallops  GI: Abd distended, soft, diffusely tender to palpation, most tender in the lower quadrants, bowel sounds active in all 4 quadrants, last BM 10/25/20  : Bonds removed, voided spontaneously without difficulty  Extremities: Warm, well perfused, 2+ pedal edema to bilateral lower extremities  Neuro: A&Ox 4, pupils 3 mm, PERRL, cranial nerves II-XII grossly intact      Labs:  Hematology  CBC RESULTS:   Recent Labs   Lab Test 10/25/20  0437   WBC 8.6   RBC 2.72*   HGB 8.5*   HCT 26.4*   MCV 97    MCH 31.3   MCHC 32.2   RDW 12.9          Coagulation  INR   Date Value Ref Range Status   10/24/2020 1.03 0.86 - 1.14 Final      AST   Date Value Ref Range Status   10/24/2020 24 0 - 45 U/L Final       Chemistries       Lab Results   Component Value Date     10/25/2020     10/24/2020     10/24/2020     10/24/2020    Lab Results   Component Value Date    CHLORIDE 109 10/25/2020    CHLORIDE 109 10/24/2020    CHLORIDE 110 10/24/2020    CHLORIDE 106 10/24/2020    Lab Results   Component Value Date    BUN 5 10/25/2020    BUN 6 10/24/2020    BUN 8 10/24/2020    BUN 16 10/24/2020      Lab Results   Component Value Date    POTASSIUM 3.9 10/25/2020    POTASSIUM 4.0 10/24/2020    POTASSIUM 3.6 10/24/2020    POTASSIUM 3.6 10/24/2020    Lab Results   Component Value Date    CO2 26 10/25/2020    CO2 26 10/24/2020    CO2 25 10/24/2020    CO2 26 10/24/2020    Lab Results   Component Value Date    CR 0.72 10/25/2020    CR 0.75 10/24/2020    CR 0.78 10/24/2020    CR 0.85 10/24/2020        Last Comprehensive Metabolic Panel:  Sodium   Date Value Ref Range Status   10/25/2020 140 133 - 144 mmol/L Final     Potassium   Date Value Ref Range Status   10/25/2020 3.9 3.4 - 5.3 mmol/L Final     Chloride   Date Value Ref Range Status   10/25/2020 109 94 - 109 mmol/L Final     Carbon Dioxide   Date Value Ref Range Status   10/25/2020 26 20 - 32 mmol/L Final     Anion Gap   Date Value Ref Range Status   10/25/2020 5 3 - 14 mmol/L Final     Glucose   Date Value Ref Range Status   10/25/2020 92 70 - 99 mg/dL Final     Urea Nitrogen   Date Value Ref Range Status   10/25/2020 5 (L) 7 - 30 mg/dL Final     Creatinine   Date Value Ref Range Status   10/25/2020 0.72 0.52 - 1.04 mg/dL Final     GFR Estimate   Date Value Ref Range Status   10/25/2020 >90 >60 mL/min/[1.73_m2] Final     Comment:     Non  GFR Calc  Starting 12/18/2018, serum creatinine based estimated GFR (eGFR) will be   calculated using  the Chronic Kidney Disease Epidemiology Collaboration   (CKD-EPI) equation.       Calcium   Date Value Ref Range Status   10/25/2020 8.2 (L) 8.5 - 10.1 mg/dL Final     Bilirubin Total   Date Value Ref Range Status   10/24/2020 0.2 0.2 - 1.3 mg/dL Final     Alkaline Phosphatase   Date Value Ref Range Status   10/24/2020 45 40 - 150 U/L Final     ALT   Date Value Ref Range Status   10/24/2020 47 0 - 50 U/L Final     AST   Date Value Ref Range Status   10/24/2020 24 0 - 45 U/L Final       Hepatic Panel  Lab Results   Component Value Date    AST 24 10/24/2020     Lab Results   Component Value Date    ALT 47 10/24/2020     No results found for: BILICONJ   Lab Results   Component Value Date    BILITOTAL 0.2 10/24/2020     Lab Results   Component Value Date    ALBUMIN 3.5 10/24/2020     Lab Results   Component Value Date    PROTTOTAL 6.3 10/24/2020      Lab Results   Component Value Date    ALKPHOS 45 10/24/2020       Arterial blood gasses  No lab results found in last 7 days.        Imaging:  No new imaging since admission

## 2020-10-25 NOTE — PROGRESS NOTES
GYN PROGRESS NOTE  10/25/20    SUBJECTIVE:   Patient feeling better this morning. Pain in abdomen much improved with pain meds. Shoulder pain gone. Has been able to walk around. Tolerated clears overnight. Bonds in place. Had a BM. Did have an episode of emesis overnight. Denies CP, SOB, N/V.    OBJECTIVE:   Patient Vitals for the past 24 hrs:   BP Temp Temp src Pulse Resp SpO2   10/25/20 0600 95/58 -- -- 84 15 96 %   10/25/20 0500 92/62 -- -- 66 15 97 %   10/25/20 0435 94/64 -- -- 90 19 97 %   10/25/20 0400 97/65 98.7  F (37.1  C) Oral 70 18 97 %   10/25/20 0330 (!) 82/53 -- -- 74 18 96 %   10/25/20 0300 (!) 82/56 -- -- 87 16 96 %   10/25/20 0200 101/64 -- -- 69 15 100 %   10/25/20 0100 99/63 -- -- 70 16 99 %   10/25/20 0000 113/65 98.6  F (37  C) Oral 70 16 99 %   10/24/20 2333 101/66 -- -- 84 -- 98 %   10/24/20 2300 97/66 -- -- 78 16 98 %   10/24/20 2200 103/71 -- -- 74 16 98 %   10/24/20 2145 99/70 -- -- 90 -- 99 %   10/24/20 2130 99/64 -- -- 74 -- 97 %   10/24/20 2115 (!) 88/62 -- -- 72 -- 98 %   10/24/20 2100 97/65 -- -- 73 18 98 %   10/24/20 2045 92/60 -- -- 72 -- 97 %   10/24/20 2030 101/61 -- -- 80 -- 98 %   10/24/20 2015 (!) 85/53 -- -- 74 -- 97 %   10/24/20 2000 (!) 88/50 98.9  F (37.2  C) Oral 72 20 98 %   10/24/20 1945 96/60 -- -- 81 -- 98 %   10/24/20 1930 94/62 -- -- 75 -- 98 %   10/24/20 1915 94/64 -- -- 75 -- 98 %   10/24/20 1900 91/60 -- -- 76 -- 99 %   10/24/20 1815 101/64 -- -- 85 -- 100 %   10/24/20 1800 95/65 -- -- 78 -- 100 %   10/24/20 1745 97/60 -- -- 81 -- 99 %   10/24/20 1730 97/60 -- -- 81 -- 100 %   10/24/20 1715 (!) 84/54 -- -- 90 -- 97 %   10/24/20 1700 95/58 -- -- 81 -- 99 %   10/24/20 1645 95/58 -- -- 78 -- 99 %   10/24/20 1630 95/59 -- -- 80 -- 98 %   10/24/20 1615 97/63 -- -- 78 -- 99 %   10/24/20 1600 99/60 98.4  F (36.9  C) -- 86 -- 100 %   10/24/20 1545 92/60 -- -- 80 -- 99 %   10/24/20 1530 90/58 -- -- 82 -- --   10/24/20 1505 (!) 82/45 -- -- 92 22 99 %   10/24/20 1500 (!)  82/50 -- -- 89 11 98 %   10/24/20 1445 (!) 85/49 -- -- 84 13 98 %   10/24/20 1435 (!) 86/46 99.1  F (37.3  C) Oral 83 18 99 %   10/24/20 1430 (!) 84/46 -- -- 82 18 99 %   10/24/20 1425 (!) 84/47 98.5  F (36.9  C) Oral 88 14 97 %   10/24/20 1420 91/56 -- -- 84 22 99 %   10/24/20 1415 99/58 -- -- 85 24 98 %   10/24/20 1410 93/58 -- -- 83 22 100 %   10/24/20 1405 100/60 -- -- 82 21 98 %   10/24/20 1400 102/60 -- -- 88 24 99 %   10/24/20 1345 96/66 -- -- 84 22 99 %   10/24/20 1340 92/55 -- -- 86 13 100 %   10/24/20 1330 100/59 -- -- 85 20 100 %   10/24/20 1325 94/57 -- -- 83 16 99 %   10/24/20 1320 92/55 -- -- 86 14 98 %   10/24/20 1318 (!) 83/54 -- -- 86 19 99 %   10/24/20 1317 (!) 79/48 -- -- 84 21 98 %   10/24/20 1315 (!) 82/48 -- -- 83 19 98 %   10/24/20 1310 -- -- -- 80 18 98 %   10/24/20 1300 94/59 -- -- 85 26 98 %   10/24/20 1245 93/56 -- -- 77 26 98 %   10/24/20 1230 (!) 82/59 -- -- 89 30 97 %   10/24/20 1225 95/57 -- -- 79 26 98 %   10/24/20 1220 94/56 -- -- 78 16 97 %   10/24/20 1215 94/56 -- -- 78 23 98 %   10/24/20 1210 93/57 -- -- 77 19 97 %   10/24/20 1200 90/54 -- -- 82 13 98 %   10/24/20 1145 (!) 86/52 -- -- 82 28 98 %   10/24/20 1135 95/51 -- -- 83 18 98 %   10/24/20 1130 (!) 86/59 -- -- 82 14 96 %   10/24/20 1115 95/56 -- -- 81 14 99 %   10/24/20 1110 97/53 -- -- 85 20 98 %   10/24/20 1105 93/58 -- -- 88 25 99 %   10/24/20 1100 (!) 83/52 -- -- 87 22 95 %   10/24/20 1055 (!) 83/52 -- -- 83 24 98 %   10/24/20 1045 (!) 77/39 -- -- 90 23 97 %   10/24/20 1030 95/61 -- -- 78 15 98 %   10/24/20 1015 95/61 -- -- 77 25 97 %   10/24/20 1000 97/61 -- -- 77 28 99 %   10/24/20 0955 95/61 -- -- 76 28 98 %   10/24/20 0945 96/64 -- -- 78 24 98 %   10/24/20 0940 97/61 -- -- 77 28 100 %   10/24/20 0935 (!) 88/59 -- -- 78 24 99 %   10/24/20 0930 (!) 89/57 -- -- 78 26 100 %   10/24/20 0915 90/59 -- -- 77 17 99 %   10/24/20 0910 92/52 -- -- 76 21 100 %   10/24/20 0905 90/58 -- -- 77 24 92 %   10/24/20 0900 92/59 -- -- 76  28 97 %   10/24/20 0855 91/57 -- -- 75 28 100 %   10/24/20 0850 90/58 -- -- 78 26 100 %   10/24/20 0845 95/53 -- -- 76 28 99 %   10/24/20 0840 94/54 -- -- 75 27 100 %   10/24/20 0835 90/52 -- -- 83 29 100 %   10/24/20 0830 93/55 -- -- 81 16 100 %   10/24/20 0825 98/54 -- -- 75 18 100 %   10/24/20 0820 (!) 80/69 -- -- 109 19 100 %   10/24/20 0815 92/55 -- -- 74 19 100 %   10/24/20 0810 90/56 -- -- 76 29 98 %   10/24/20 0805 92/54 -- -- 77 28 98 %   10/24/20 0800 90/56 -- -- 76 26 99 %   10/24/20 0755 (!) 85/52 -- -- 76 19 98 %   10/24/20 0750 (!) 89/51 -- -- 75 20 98 %   10/24/20 0745 (!) 80/47 -- -- 78 19 99 %     Gen- Laying in bed, NAD  CV- RRR, no m/r/g  Pulm- NWOB, CTAB  Abd- soft, distended, tender to palpitation, dull to percussion, BS+  Extr- no edema, nontender      Intake/Output Summary (Last 24 hours) at 10/25/2020 0741  Last data filed at 10/25/2020 0600  Gross per 24 hour   Intake 910 ml   Output 3695 ml   Net -2785 ml     New Labs/Imaging-   CMP  Recent Labs   Lab 10/25/20  0437 10/24/20  2244 10/24/20  1620 10/24/20  0203    140 140 138   POTASSIUM 3.9 4.0 3.6 3.6   CHLORIDE 109 109 110* 106   CO2 26 26 25 26   ANIONGAP 5 4 6 6   GLC 92 108* 101* 123*   BUN 5* 6* 8 16   CR 0.72 0.75 0.78 0.85   GFRESTIMATED >90 >90 >90 87   GFRESTBLACK >90 >90 >90 >90   RAVI 8.2* 8.4* 7.5* 8.4*   MAG 1.9  --   --   --    PHOS 2.7  --   --   --    PROTTOTAL  --   --   --  6.3*   ALBUMIN  --   --   --  3.5   BILITOTAL  --   --   --  0.2   ALKPHOS  --   --   --  45   AST  --   --   --  24   ALT  --   --   --  47     CBC  Recent Labs   Lab 10/25/20  0437 10/24/20  2244 10/24/20  1620 10/24/20  1133 10/24/20  0751   WBC 8.6  --  11.4* 10.1 13.1*   RBC 2.72*  --  2.69* 2.25* 2.53*   HGB 8.5* 8.8* 8.5* 7.2* 8.3*   HCT 26.4*  --  25.9* 22.6* 24.8*   MCV 97  --  96 100 98   MCH 31.3  --  31.6 32.0 32.8   MCHC 32.2  --  32.8 32.6 33.5   RDW 12.9  --  12.7 12.1 11.9     --  153 142* 156     INR  Recent Labs   Lab  10/24/20  0835   INR 1.03       Pending cultures (date obtained):   BCx (10/24): NGTD x2    A/P  39 year old admitted with abdominal pain, distension, and hypotension following oocyte retrieval. This is likely due to ovarian bleeding from the egg retrieval procedure. Hemoglobin has increased appropriately following transfusion and stable at 8.5 and blood pressures improving now 90-100s/60-70s. Rest of labs stable, pain improving, and tolerating PO. Clinically improving and ready for transfer today, possibly discharge later today.    Pain:  PO pain meds PRN  FEN/GI: CLD, advance diet as tolerated; BMP wnl x4  GI:  Bowel regimen, antiemetics PRN  CV:  BP montoring  Pulm:  No active issues  Renal:  Cr wnl x4  Heme:  10.3 > 8.3 > 7.2 (after 3.5L) > 1U pRBC > 8.5 > 8.8 > 8.5  ID:  Afebrile, WBC wnl x4, Bcx NGTD  PPX:  SCDs, ambulation  Dispo:  Transfer to floor vs transfer to gyn service vs discharge    #ADHD  - Continue PTA adderall    Discussed with Dr. Piedra.    Juan Garcia MD MPH  OB/Gyn PGY-2  10/25/20 7:44 AM    I discussed Soledad Botello on 10/25/2020 with Dr. Garcia and agree with the presentation, exam and plan of care documented in this note with edits by me.   Felicia Piedra MD MPH

## 2020-10-25 NOTE — DISCHARGE SUMMARY
[facility]  Gynecology Discharge Summary    Admission Date:  10/24/20  Discharge Date:  10/25/20    Admission Attending: Delmy Givens MD  Discharge Attending: Felicia Piedra MD    Admission Diagnosis:  - Hypotension  - Abdominal pain  - Leukocytosis  - Hemoconcentration  - Thrombocytosis     Discharge Diagnosis:  - ruptured hemorrhagic cyst    Procedures Performed:  - CT - AP  - CXR  - Abdominal US  - Albumin infusion  - 1 unit blood transfusion    Admission History:  Soledad Botello is a 39 year old G0 POD#1 s/p egg retrieval at [facility] who presents complaining of bilateral shoulder pain and significant bloating. She says that she had been feeling bloated and generally unwell prior to her egg retrieval this morning. After her egg retrieval, she felt slightly better but then started feeling worse over the course of yesterday. She has been unable to take deep breaths due to the shoulder pain but denies any specific chest pain. She feels significantly bloated/pressure and has noticed an increase in her abdominal size. She says that she weighed herself twice yesterday and noticed a 6.5 lbs weight gain throughout the day. She was told that she is at high risk for OHSS given that they retrieved almost 30 eggs from her left ovary. At approximately 9 PM last night, she felt dizzy and almost lost consciousness. She has been feeling intermittent nausea but no episodes of emesis. Also has not urinated since her procedure this morning despite eating and drinking.     Hospital Course:  Due low blood pressures in the [facility] ED, the patient was not transferred to the [facility] gyn service and was admitted to the SICU for close observation. Her blood pressure improved with IV hydration, albumin infusion, and 1U pRBC. Hemoconcentration resolved with IV fluids. Her hemoglobin stabilized after 1u of pRBC and was 9.2 on discharge. On HD#2 she discharged from the SICU to the home. BMP, Creatinine,  LFTs, and Coags were normal throughout her stay. Urine output improved and patient was able to void spontaneously. Pain improved with PO pain meds and controlled at discharge. By discharge she was tolerating a regular diet. Her presentation was thought to be due to be the result of bleeding from her ovaries after the egg retrieval process. OHSS very unlikely given that patient was triggered with Lupron rather than bHCG, minimal abdominal fluid, and lack of abnormalities.    Discharge Plans:  - The patient was discharged to home  - She will follow-up with her [facility] clinic outpatient (call clinic on Monday, 10/26/2020) and was instructed to stop taking her cabergoline and birth control  - She was instructed to call or return to ED if she has any of the following:    - Temperature greater than 100.4F   - Pain not controlled by pain medications   - Uncontrolled nausea/vomiting   - Chest pain, SOB, dizziness, light-headedness    - Discharge medications include:     Review of your medicines      START taking      Dose / Directions   HYDROmorphone 4 MG tablet  Commonly known as: Dilaudid      Dose: 4 mg  Take 1 tablet (4 mg) by mouth every 6 hours as needed for severe pain  Quantity: 20 tablet  Refills: 0        CONTINUE these medicines which have NOT CHANGED      Dose / Directions   acetaminophen 500 MG tablet  Commonly known as: TYLENOL      Dose: 500 mg  Take 500 mg by mouth every 6 hours as needed for mild pain  Refills: 0     ADDERALL PO      Dose: 10 mg  Take 10 mg by mouth 3 times daily  Refills: 0     calcium carbonate-vitamin D 500-400 MG-UNIT tablet  Commonly known as: OS-RAVI      Dose: 1 tablet  Take 1 tablet by mouth daily  Refills: 0     co-enzyme Q-10 100 MG Caps capsule      Dose: 600 mg  Take 600 mg by mouth daily  Refills: 0     doxycycline hyclate 100 MG capsule  Commonly known as: VIBRAMYCIN      Dose: 100 mg  Take 100 mg by mouth 2 times daily  Refills: 0     ipratropium 0.03 % nasal spray  Commonly known  as: ATROVENT      Dose: 2 spray  Spray 2 sprays into both nostrils 2 times daily as needed  Refills: 0     multivitamin, therapeutic Tabs tablet      Dose: 1 tablet  Take 1 tablet by mouth daily  Refills: 0        STOP taking    cabergoline 0.5 MG tablet  Commonly known as: DOSTINEX        desogestrel-ethinyl estradiol 0.15-30 MG-MCG tablet  Commonly known as: APRI        oxyCODONE-acetaminophen 5-325 MG tablet  Commonly known as: PERCOCET              Where to get your medicines      These medications were sent to [facility] - [place], [place] - [address] [place] [id number]    Phone: [phone]     HYDROmorphone 4 MG tablet         Patient discussed with Dr. Piedra and Dr. Jd Chun MD  OB/GYN PGY-2  10/25/20 2:27 PM    I discussed Soledad Botello on 10/25/2020.  I agree with the presentation, hospital details and plan of care this discharge summary with edits by me.   Felicia Piedra MD MPH

## 2020-10-25 NOTE — PROGRESS NOTES
STAFF NOTE:  Decent night  Walked independently this morning, needing lots of narcotics    Exam awake / lucid; smiles more than she had before  Appears generally uncomfortable  HR 70-80s; SBP 90s-100s  Abdomen tense  Some edema    Hgb has been stable  Lytes and renal function stable  Leukocytosis resolved    POCUS shows a complicated fluid collection, probably blood, no free ascites that I can tap  LV and RV function are fine  No pleural effusions  IV dilated, non-collapsible (ie, intra-vascularly full)    This is a 39F who presents with likely hemorrhagic ovarian cyst rupture.   Based on point-of-care ultrasound, I am confident that she is intra-vascularly full (not depleted / hemoconcentrated lke would see with OHSS).  We will start lovenox today, transfer to floor, and be aggressive about a bowel regimen (lactulose + suppository).  She is needing a significant amount of narcotics to get comfortable enough to be willing to discharge; will encourage more aggressive use of NSAIDs and tylenol.    ADHD:  -adderol    OVARIAN HYPERSTIMULATION SYNDROME VS HEMORRHAGIC OVARIAN CYST RUPTURE:  -as above, I'm pretty confident this was primarily a hemorrhagic problem.  -watch for thrombotic complications: called her primary [facility] doc (Fang Miles [phone]); who agreed with holding both birth control and the carbergoline.  This should not delay her pregnancy, per Dr. Miles.  She should stay off her birth control until her next period.    ACUTE BLOOD LOSS ANEMIA:  -appears to have stopped bleeding.  No need for serial Hgb checks now    CONSTIPATION:  -aggressive bowel regimen, including lactulose [id number] until response + suppository    ANASARCA:  -ok for low-dose lasix today (clearly not intravascularly deplete, as I might expect with OHSS).    MISC:  -Code status is full code  -general diet  - updated by myself  -lovenox should start if she doesn't discharge today; PPI not necssary  -lines: peripheral only  -zhong  already  out; she is able to void on her own  -anticipate discharge to home today or tomorrow      Billing statement: 33min of critical care time; spent in an initial review of imaging, labs, physical exam, and discussion of the patient with my own team and the extended care team including the primary service; and including family conference, and including time that was spent on active bedside management for such things as ultrasound exam as described above.   Based on this patient's presentation / recent intervention and my bedside assessment, I felt there was or is a reasonably high probability of imminent or life-threatening deterioration today or tonight for hemorrhagic reasons.   My overall critical care time, as described in detail above, includes such things as coordination of care, arrhythmia and hemodynamics management with infusions of medicines, respiratory management, fluid therapy including fluid boluses, and pain and sedation therapy. This time excludes time I spent personally performing or supervising procedures for this patient.    LYN Rubin MD  Clinical   Anesthesia / Critical Care  *[id number]

## 2020-10-25 NOTE — PLAN OF CARE
Discharged to: home with spouse at 1410  Belongings: sent with pt  AVS (After Visit Summary) discussed with: Patient and spouse at bedside

## 2020-10-30 LAB
BACTERIA SPEC CULT: NO GROWTH
BACTERIA SPEC CULT: NO GROWTH
SPECIMEN SOURCE: NORMAL
SPECIMEN SOURCE: NORMAL

## 2021-03-10 ENCOUNTER — HOSPITAL ENCOUNTER (EMERGENCY)
Facility: CLINIC | Age: 41
Discharge: HOME | End: 2021-03-10
Payer: COMMERCIAL

## 2021-03-10 VITALS
RESPIRATION RATE: 16 BRPM | BODY MASS INDEX: 21.14 KG/M2 | TEMPERATURE: 98.2 F | SYSTOLIC BLOOD PRESSURE: 108 MMHG | DIASTOLIC BLOOD PRESSURE: 75 MMHG | HEIGHT: 67 IN | HEART RATE: 76 BPM | OXYGEN SATURATION: 99 %

## 2021-03-10 NOTE — ED TRIAGE NOTES
13 weeks pregnant, has not had a BM in 7 days, was prescribed dulcolax, since taking she has had abdominal cramping, nausea, vomiting and dry heaves.

## 2021-04-25 ENCOUNTER — HEALTH MAINTENANCE LETTER (OUTPATIENT)
Age: 41
End: 2021-04-25

## 2021-06-04 VITALS
OXYGEN SATURATION: 98 % | WEIGHT: 138.9 LBS | RESPIRATION RATE: 16 BRPM | TEMPERATURE: 98.1 F | HEART RATE: 80 BPM | DIASTOLIC BLOOD PRESSURE: 75 MMHG | BODY MASS INDEX: 22.42 KG/M2 | SYSTOLIC BLOOD PRESSURE: 109 MMHG

## 2021-06-18 NOTE — PATIENT INSTRUCTIONS - HE
Patient Instructions by Andrzej Zavala PA-C at 8/5/2020  9:00 AM     Author: Andrzej Zavala PA-C Service: -- Author Type: Physician Assistant    Filed: 8/5/2020  9:30 AM Encounter Date: 8/5/2020 Status: Signed    : Andrzej Zavala PA-C (Physician Assistant)       Keep the ear clean dry and protected.  No swimming for 1 week.  Use of the ear wick for 2 days.  Place the eardrops as directed into the left ear.  Remove the ear wick after 2 days.  Follow-up with primary care provider if not getting 100% resolution or if new symptoms or concerns arise.        Patient Education   When Your Child Has Swimmers Ear   If your child spends a lot of time in the water and is having ear pain, he or she may have developed swimmer's ear (otitis externa). It is a skin infection that happens in the ear canal, between the opening of the ear and the eardrum. When the ear canal becomes too moist, bacteria can grow. This causes pain, swelling, and redness in the ear canal.  Who is at risk for swimmers ear?  Children are more likely to get swimmers ear if they:    Swim or lie down in a bathtub or hot tub    Clean their ear canals roughly. This causes tiny cuts or scratches that easily get infected.    Have ear canals that are naturally narrow    Have excess earwax that traps fluid in the ear canal  What are the symptoms of swimmers ear?   The most common symptoms of swimmers ear are:    Ear pain, especially when pulling on the earlobe or when chewing    Redness or swelling in the ear canal or near the ear    Itching in the ear    Drainage from the ear    Feeling like water is in the ear    Fever    Problems hearing  How is swimmers ear diagnosed?  The healthcare provider will examine your child. He or she will also ask questions to help rule out other causes of ear pain. The healthcare provider will look for:    Redness and swelling in the ear canal    Drainage from the ear canal    Pain when moving the earlobe  How is swimmers ear  treated?  To treat your denis ear, the healthcare provider may recommend:    Medicines such as antibiotic ear drops or a pain reliever that is put in the ear. Antibiotic medicine taken by mouth (orally) is not recommended.    Over-the-counter pain relievers such as acetaminophen and ibuprofen. Don't give ibuprofen to infants younger than 6 months of age or to children who are dehydrated or constantly vomiting. Dont give your child aspirin to relieve a fever. Using aspirin to treat a fever in children could cause a serious condition called Reye syndrome.  How can you prevent swimmers ear?  Ask your child's healthcare provider about using the following to help prevent swimmers ear:    After your child has been in the water, have your child tilt his or her head to each side to help any water drain out. You can also dry his or her ear canal using a blow dryer. Use a low air and cool setting. Hold the dryer at least 12 inches from your denis head. Wave the dryer slowly back and forth--dont hold it still. You may also gently pull the earlobe down and slightly backward to allow the air to reach the ear canal.    Use a tissue to gently draw water out of the ear. Your denis healthcare provider can show you how.    Use over-the-counter ear drops if the healthcare provider suggests this. These help dry out the inside of your denis ear. Smaller children may need to lie down on a couch or bed for a short time to keep the drops inside the ear canal.    Gently clean your denis ear canal. Don't use cotton swabs.  When to call your denis healthcare provider  Call your child's healthcare provider if your child has any of the following:    Increased pain redness, or swelling of the outer ear    Ear pain, redness, or swelling that does not go away with treatment    Fever (see Fever and children, below)     Fever and children  Always use a digital thermometer to check your denis temperature. Never use a mercury thermometer.  For  infants and toddlers, be sure to use a rectal thermometer correctly. A rectal thermometer may accidentally poke a hole in (perforate) the rectum. It may also pass on germs from the stool. Always follow the product makers directions for proper use. If you dont feel comfortable taking a rectal temperature, use another method. When you talk to your denis healthcare provider, tell him or her which method you used to take your denis temperature.  Here are guidelines for fever temperature. Ear temperatures arent accurate before 6 months of age. Dont take an oral temperature until your child is at least 4 years old.  Infant under 3 months old:    Ask your denis healthcare provider how you should take the temperature.    Rectal or forehead (temporal artery) temperature of 100.4 F (38 C) or higher, or as directed by the provider    Armpit temperature of 99 F (37.2 C) or higher, or as directed by the provider  Child age 3 to 36 months:    Rectal, forehead (temporal artery), or ear temperature of 102 F (38.9 C) or higher, or as directed by the provider    Armpit temperature of 101 F (38.3 C) or higher, or as directed by the provider  Child of any age:    Repeated temperature of 104 F (40 C) or higher, or as directed by the provider    Fever that lasts more than 24 hours in a child under 2 years old. Or a fever that lasts for 3 days in a child 2 years or older.   Date Last Reviewed: 11/1/2016 2000-2017 [employer]. [address] [place] [address], [place] [address]. All rights reserved. This information is not intended as a substitute for professional medical care. Always follow your healthcare professional's instructions.         Patient Education   External Ear Infection (Adult)    External otitis (also called swimmers ear) is an infection in the ear canal. It is often caused by bacteria or fungus. It can occur a few days after water gets trapped in the ear canal (from swimming or bathing). It can also occur after  cleaning too deeply in the ear canal with a cotton swab or other object. Sometimes, hair care products get into the ear canal and cause this problem.  Symptoms can include pain, fever, itching, redness, drainage, or swelling of the ear canal. Temporary hearing loss may also occur.  Home care    Do not try to clean the ear canal. This can push pus and bacteria deeper into the canal.    Use prescribed ear drops as directed. These help reduce swelling and fight the infection. If an ear wick was placed in the ear canal, apply drops right onto the end of the wick. The wick will draw the medicine into the ear canal even if it is swollen closed.    A cotton ball may be loosely placed in the outer ear to absorb any drainage.    You may use acetaminophen or ibuprofen to control pain, unless another medicine was prescribed. Note: If you have chronic liver or kidney disease or ever had a stomach ulcer or GI bleeding, talk to your healthcare provider before taking any of these medicines.    Do not allow water to get into your ear when bathing. Also, don't swim until the infection has cleared.  Prevention    Keep your ears dry. This helps lower the risk of infection. Dry your ears with a towel or hair dryer after getting wet. Also, use ear plugs when swimming.    Do not stick any objects in the ear to remove wax.    If you feel water trapped in your ear, use ear drops right away. You can get these drops over the counter at most drugstores. They work by removing water from the ear canal.  Follow-up care  Follow up with your healthcare provider in 1 week, or as advised.  When to seek medical advice  Call your healthcare provider right away if any of these occur:    Ear pain becomes worse or doesnt improve after 3 days of treatment    Redness or swelling of the outer ear occurs or gets worse    Headache    Painful or stiff neck    Drowsiness or confusion    Fever of 100.4 F (38 C) or higher, or as directed by your healthcare  provider    Seizure  Date Last Reviewed: 10/1/2017    [id number] [employer]. [address] [place] [address], [place] [address]. All rights reserved. This information is not intended as a substitute for professional medical care. Always follow your healthcare professional's instructions.

## 2021-06-29 NOTE — PROGRESS NOTES
Progress Notes by Andrzej Zavala PA-C at 8/5/2020  9:00 AM     Author: Andrzej Zavala PA-C Service: -- Author Type: Physician Assistant    Filed: 8/5/2020 11:30 AM Encounter Date: 8/5/2020 Status: Signed    : Andrzej Zavala PA-C (Physician Assistant)       Subjective:      Patient ID: Soledad Botello is a 39 y.o. female.    Chief Complaint:    HPI  Soledad Botello is a 39 y.o. female who presents today complaining of left sided ear pain and slight drainage.  Patient just noticed the drainage for 1 day.  He did not have a prodrome of left-sided ear pain that was relieved with drainage that would be consistent with a tympanic membrane perforation.  Rather, the patient states that she felt that she had cerumen impaction and used an over-the-counter earwax removal kit with brushes and irrigation.  Subsequent to doing the cerumen removal she started having some pain and irritation from the left ear.  He also has a pool in her yard and has been swimming quite frequently in the heat of the summer.  At this time, the patient does not have involvement of the right ear.  No otorrhea rhinorrhea fever chills night sweats fatigue overt hearing loss or balance deficits to report.      History reviewed. No pertinent past medical history.    History reviewed. No pertinent surgical history.    History reviewed. No pertinent family history.    Social History     Tobacco Use   ? Smoking status: Never Smoker   ? Smokeless tobacco: Never Used   Substance Use Topics   ? Alcohol use: Not on file   ? Drug use: Not on file       Review of Systems  As above in HPI otherwise negative  Objective:     /75 (Patient Site: Right Arm, Patient Position: Sitting, Cuff Size: Adult Regular)   Pulse 80   Temp 98.1  F (36.7  C) (Oral)   Resp 16   Wt 138 lb 14.4 oz (63 kg)   LMP 08/04/2020 (Exact Date)   SpO2 98%   Breastfeeding No     Physical Exam  General: Patient is resting comfortably no acute distress is afebrile  HEENT: Head is  normocephalic atraumatic   eyes are PERRL EOMI sclera anicteric   TMs on the left is nonerythematous but gray and opaque. There is wax up against tympanic membrane.    EAC on the left is with mild cerumen against the tympanic membrane and the canal is tortuous and it is erythematous.  There is pain with manipulation of the external pinna and tragus.  An ear wick is placed into the left external auditory canal and then had it expanded with sterile water.  Patient tolerated procedure very well.  TM on the right is clear   EAC on the left is with minimal cerumen in the inferior protion of the canalno cerumen  Throat is without pharyngeal wall erythema and no exudate  No cervical lymphadenopathy present  LUNGS: Clear to auscultation bilaterally  HEART: Regular rate and rhythm  Skin: Without rash non-diaphoretic        Assessment:     Procedures    The encounter diagnosis was Acute swimmer's ear of left side.    Plan:     1. Acute swimmer's ear of left side  neomycin-polymyxin-hydrocortisone (CORTISPORIN) 3.5-10,000-1 mg/mL-unit/mL-% otic suspension         Patient Instructions     Keep the ear clean dry and protected.  No swimming for 1 week.  Use of the ear wick for 2 days.  Place the eardrops as directed into the left ear.  Remove the ear wick after 2 days.  Follow-up with primary care provider if not getting 100% resolution or if new symptoms or concerns arise.        Patient Education   When Your Child Has Swimmers Ear   If your child spends a lot of time in the water and is having ear pain, he or she may have developed swimmer's ear (otitis externa). It is a skin infection that happens in the ear canal, between the opening of the ear and the eardrum. When the ear canal becomes too moist, bacteria can grow. This causes pain, swelling, and redness in the ear canal.  Who is at risk for swimmers ear?  Children are more likely to get swimmers ear if they:    Swim or lie down in a bathtub or hot tub    Clean their ear  canals roughly. This causes tiny cuts or scratches that easily get infected.    Have ear canals that are naturally narrow    Have excess earwax that traps fluid in the ear canal  What are the symptoms of swimmers ear?   The most common symptoms of swimmers ear are:    Ear pain, especially when pulling on the earlobe or when chewing    Redness or swelling in the ear canal or near the ear    Itching in the ear    Drainage from the ear    Feeling like water is in the ear    Fever    Problems hearing  How is swimmers ear diagnosed?  The healthcare provider will examine your child. He or she will also ask questions to help rule out other causes of ear pain. The healthcare provider will look for:    Redness and swelling in the ear canal    Drainage from the ear canal    Pain when moving the earlobe  How is swimmers ear treated?  To treat your denis ear, the healthcare provider may recommend:    Medicines such as antibiotic ear drops or a pain reliever that is put in the ear. Antibiotic medicine taken by mouth (orally) is not recommended.    Over-the-counter pain relievers such as acetaminophen and ibuprofen. Don't give ibuprofen to infants younger than 6 months of age or to children who are dehydrated or constantly vomiting. Dont give your child aspirin to relieve a fever. Using aspirin to treat a fever in children could cause a serious condition called Reye syndrome.  How can you prevent swimmers ear?  Ask your child's healthcare provider about using the following to help prevent swimmers ear:    After your child has been in the water, have your child tilt his or her head to each side to help any water drain out. You can also dry his or her ear canal using a blow dryer. Use a low air and cool setting. Hold the dryer at least 12 inches from your denis head. Wave the dryer slowly back and forth--dont hold it still. You may also gently pull the earlobe down and slightly backward to allow the air to reach the ear  canal.    Use a tissue to gently draw water out of the ear. Your denis healthcare provider can show you how.    Use over-the-counter ear drops if the healthcare provider suggests this. These help dry out the inside of your denis ear. Smaller children may need to lie down on a couch or bed for a short time to keep the drops inside the ear canal.    Gently clean your denis ear canal. Don't use cotton swabs.  When to call your denis healthcare provider  Call your child's healthcare provider if your child has any of the following:    Increased pain redness, or swelling of the outer ear    Ear pain, redness, or swelling that does not go away with treatment    Fever (see Fever and children, below)     Fever and children  Always use a digital thermometer to check your denis temperature. Never use a mercury thermometer.  For infants and toddlers, be sure to use a rectal thermometer correctly. A rectal thermometer may accidentally poke a hole in (perforate) the rectum. It may also pass on germs from the stool. Always follow the product makers directions for proper use. If you dont feel comfortable taking a rectal temperature, use another method. When you talk to your denis healthcare provider, tell him or her which method you used to take your denis temperature.  Here are guidelines for fever temperature. Ear temperatures arent accurate before 6 months of age. Dont take an oral temperature until your child is at least 4 years old.  Infant under 3 months old:    Ask your denis healthcare provider how you should take the temperature.    Rectal or forehead (temporal artery) temperature of 100.4 F (38 C) or higher, or as directed by the provider    Armpit temperature of 99 F (37.2 C) or higher, or as directed by the provider  Child age 3 to 36 months:    Rectal, forehead (temporal artery), or ear temperature of 102 F (38.9 C) or higher, or as directed by the provider    Armpit temperature of 101 F (38.3 C) or higher, or  as directed by the provider  Child of any age:    Repeated temperature of 104 F (40 C) or higher, or as directed by the provider    Fever that lasts more than 24 hours in a child under 2 years old. Or a fever that lasts for 3 days in a child 2 years or older.   Date Last Reviewed: 11/1/2016 2000-2017 [employer]. [address] [place] [address]. All rights reserved. This information is not intended as a substitute for professional medical care. Always follow your healthcare professional's instructions.         Patient Education   External Ear Infection (Adult)    External otitis (also called swimmers ear) is an infection in the ear canal. It is often caused by bacteria or fungus. It can occur a few days after water gets trapped in the ear canal (from swimming or bathing). It can also occur after cleaning too deeply in the ear canal with a cotton swab or other object. Sometimes, hair care products get into the ear canal and cause this problem.  Symptoms can include pain, fever, itching, redness, drainage, or swelling of the ear canal. Temporary hearing loss may also occur.  Home care    Do not try to clean the ear canal. This can push pus and bacteria deeper into the canal.    Use prescribed ear drops as directed. These help reduce swelling and fight the infection. If an ear wick was placed in the ear canal, apply drops right onto the end of the wick. The wick will draw the medicine into the ear canal even if it is swollen closed.    A cotton ball may be loosely placed in the outer ear to absorb any drainage.    You may use acetaminophen or ibuprofen to control pain, unless another medicine was prescribed. Note: If you have chronic liver or kidney disease or ever had a stomach ulcer or GI bleeding, talk to your healthcare provider before taking any of these medicines.    Do not allow water to get into your ear when bathing. Also, don't swim until the infection has  cleared.  Prevention    Keep your ears dry. This helps lower the risk of infection. Dry your ears with a towel or hair dryer after getting wet. Also, use ear plugs when swimming.    Do not stick any objects in the ear to remove wax.    If you feel water trapped in your ear, use ear drops right away. You can get these drops over the counter at most drugstores. They work by removing water from the ear canal.  Follow-up care  Follow up with your healthcare provider in 1 week, or as advised.  When to seek medical advice  Call your healthcare provider right away if any of these occur:    Ear pain becomes worse or doesnt improve after 3 days of treatment    Redness or swelling of the outer ear occurs or gets worse    Headache    Painful or stiff neck    Drowsiness or confusion    Fever of 100.4 F (38 C) or higher, or as directed by your healthcare provider    Seizure  Date Last Reviewed: 10/1/2017    9434-2177 [employer]. [address] [place] [address], [place] [address]. All rights reserved. This information is not intended as a substitute for professional medical care. Always follow your healthcare professional's instructions.

## 2021-09-23 ENCOUNTER — OFFICE VISIT (OUTPATIENT)
Dept: URGENT CARE | Facility: URGENT CARE | Age: 41
End: 2021-09-23
Payer: COMMERCIAL

## 2021-09-23 VITALS
SYSTOLIC BLOOD PRESSURE: 125 MMHG | HEART RATE: 117 BPM | BODY MASS INDEX: 26.63 KG/M2 | DIASTOLIC BLOOD PRESSURE: 83 MMHG | OXYGEN SATURATION: 99 % | WEIGHT: 170 LBS | TEMPERATURE: 98.1 F

## 2021-09-23 DIAGNOSIS — Z87.59 HISTORY OF POSTPARTUM HEMORRHAGE: ICD-10-CM

## 2021-09-23 DIAGNOSIS — R30.0 DYSURIA: ICD-10-CM

## 2021-09-23 DIAGNOSIS — R06.09 DYSPNEA ON EXERTION: ICD-10-CM

## 2021-09-23 DIAGNOSIS — R68.83 CHILLS (WITHOUT FEVER): ICD-10-CM

## 2021-09-23 LAB
ALBUMIN UR-MCNC: NEGATIVE MG/DL
APPEARANCE UR: CLEAR
BASOPHILS # BLD AUTO: 0.1 10E3/UL (ref 0–0.2)
BASOPHILS NFR BLD AUTO: 1 %
BILIRUB UR QL STRIP: NEGATIVE
COLOR UR AUTO: YELLOW
EOSINOPHIL # BLD AUTO: 0.4 10E3/UL (ref 0–0.7)
EOSINOPHIL NFR BLD AUTO: 3 %
ERYTHROCYTE [DISTWIDTH] IN BLOOD BY AUTOMATED COUNT: 13.3 % (ref 10–15)
GLUCOSE UR STRIP-MCNC: NEGATIVE MG/DL
HCT VFR BLD AUTO: 34.5 % (ref 35–47)
HGB BLD-MCNC: 11.2 G/DL (ref 11.7–15.7)
HGB UR QL STRIP: ABNORMAL
IMM GRANULOCYTES # BLD: 0.1 10E3/UL
IMM GRANULOCYTES NFR BLD: 1 %
KETONES UR STRIP-MCNC: NEGATIVE MG/DL
LEUKOCYTE ESTERASE UR QL STRIP: NEGATIVE
LYMPHOCYTES # BLD AUTO: 3 10E3/UL (ref 0.8–5.3)
LYMPHOCYTES NFR BLD AUTO: 29 %
MCH RBC QN AUTO: 29.2 PG (ref 26.5–33)
MCHC RBC AUTO-ENTMCNC: 32.5 G/DL (ref 31.5–36.5)
MCV RBC AUTO: 90 FL (ref 78–100)
MONOCYTES # BLD AUTO: 0.7 10E3/UL (ref 0–1.3)
MONOCYTES NFR BLD AUTO: 7 %
NEUTROPHILS # BLD AUTO: 6.3 10E3/UL (ref 1.6–8.3)
NEUTROPHILS NFR BLD AUTO: 60 %
NITRATE UR QL: NEGATIVE
PH UR STRIP: 5.5 [PH] (ref 5–7)
PLATELET # BLD AUTO: 475 10E3/UL (ref 150–450)
RBC # BLD AUTO: 3.84 10E6/UL (ref 3.8–5.2)
RBC #/AREA URNS AUTO: NORMAL /HPF
SP GR UR STRIP: 1.01 (ref 1–1.03)
UROBILINOGEN UR STRIP-ACNC: 0.2 E.U./DL
WBC # BLD AUTO: 10.5 10E3/UL (ref 4–11)
WBC #/AREA URNS AUTO: NORMAL /HPF

## 2021-09-23 PROCEDURE — 99214 OFFICE O/P EST MOD 30 MIN: CPT | Performed by: FAMILY MEDICINE

## 2021-09-23 PROCEDURE — 36415 COLL VENOUS BLD VENIPUNCTURE: CPT | Performed by: FAMILY MEDICINE

## 2021-09-23 PROCEDURE — 85025 COMPLETE CBC W/AUTO DIFF WBC: CPT | Performed by: FAMILY MEDICINE

## 2021-09-23 PROCEDURE — 81001 URINALYSIS AUTO W/SCOPE: CPT

## 2021-09-23 NOTE — PROGRESS NOTES
Patient presents with:  Urgent Care  UTI: c/o dysuria for 1 day       Subjective     Soledad Botello is a 40 year old female who presents to clinic today for the following health issues:    HPI     URINARY TRACT SYMPTOMS      Duration: 1 day    Description  8 days postpartum,   2 units transfused before discharge  PPH, retained placenta   Chills-at night for last 4 days   Shortness of breath at rest, dyspnea on exertion -increased today    Intensity:  moderate    Accompanying signs and symptoms:  Fever/chills: YES  Flank pain no   Nausea and vomiting: no   Vaginal symptoms: postpartum passed a clot,-12 noon  3 more clots since that time-not passing clots day prior   Wearing pads, urine incontinence -post vaginal delivery   Abdominal/Pelvic Pain: no     History  History of frequent UTI's: no   History of kidney stones: no   Sexually Active: no   Possibility of pregnancy: postpartum,     Precipitating or alleviating factors: None    Therapies tried and outcome: none       Vaccinated-covid  8/2021        Past Medical History:   Diagnosis Date     ADD (attention deficit disorder)      Social History     Tobacco Use     Smoking status: Never Smoker     Smokeless tobacco: Never Used   Substance Use Topics     Alcohol use: Yes     Alcohol/week: 8.0 standard drinks     Types: 4 Glasses of wine, 4 Cans of beer per week       Current Outpatient Medications   Medication Sig Dispense Refill     acetaminophen (TYLENOL) 500 MG tablet Take 500 mg by mouth every 6 hours as needed for mild pain       Amphetamine-Dextroamphetamine (ADDERALL PO) Take 10 mg by mouth daily        calcium carbonate-vitamin D (OS-RAVI) 500-400 MG-UNIT tablet Take 1 tablet by mouth daily       co-enzyme Q-10 100 MG CAPS capsule Take 600 mg by mouth daily       ipratropium (ATROVENT) 0.03 % nasal spray Spray 2 sprays into both nostrils 2 times daily as needed       multivitamin, therapeutic (THERA-VIT) TABS tablet Take 1 tablet by mouth daily        Allergies   Allergen Reactions     Citalopram Nausea and Vomiting     PN: Nausea         ROS are negative, except as otherwise noted HPI      Objective    /83   Pulse 117   Temp 98.1  F (36.7  C) (Tympanic)   Wt 77.1 kg (170 lb)   SpO2 99%   Breastfeeding Unknown   BMI 26.63 kg/m    Body mass index is 26.63 kg/m .  Physical Exam   GENERAL: alert and fatigued   EYES: Eyes grossly normal to inspection, PERRL and conjunctivae and sclerae normal  HENT: ear canals and TM's normal, nose and mouth without ulcers or lesions  NECK: no adenopathy, no asymmetry, masses, or scars and thyroid normal to palpation  RESP: lungs clear to auscultation - no rales, rhonchi or wheezes, no chest wall tenderness   CV: regular rate and rhythm, normal S1 S2, no S3 or S4, no murmur, click or rub,   ABDOMEN: soft, suprapubic tenderness, no hepatosplenomegaly  and bowel sounds normal,  Negative CVAT   MS: no gross musculoskeletal defects noted, trace pretibial  Edema bilateral, posterior calf non tender bilateral, neg homans   NEURO: Normal strength and tone, mentation intact and speech normal, normal gait       Diagnostic Test Results:  Labs reviewed in Epic  Results for orders placed or performed in visit on 09/23/21   UA Macro with Reflex to Micro and Culture - lab collect     Status: Abnormal    Specimen: Urine, Midstream   Result Value Ref Range    Color Urine Yellow Colorless, Straw, Light Yellow, Yellow    Appearance Urine Clear Clear    Glucose Urine Negative Negative mg/dL    Bilirubin Urine Negative Negative    Ketones Urine Negative Negative mg/dL    Specific Gravity Urine 1.015 1.003 - 1.035    Blood Urine Moderate (A) Negative    pH Urine 5.5 5.0 - 7.0    Protein Albumin Urine Negative Negative mg/dL    Urobilinogen Urine 0.2 0.2, 1.0 E.U./dL    Nitrite Urine Negative Negative    Leukocyte Esterase Urine Negative Negative   Urine Microscopic     Status: Normal   Result Value Ref Range    RBC Urine 0-2 0-2 /HPF /HPF     WBC Urine None Seen 0-5 /HPF /HPF    Narrative    Urine Culture not indicated   CBC with platelets and differential     Status: Abnormal   Result Value Ref Range    WBC Count 10.5 4.0 - 11.0 10e3/uL    RBC Count 3.84 3.80 - 5.20 10e6/uL    Hemoglobin 11.2 (L) 11.7 - 15.7 g/dL    Hematocrit 34.5 (L) 35.0 - 47.0 %    MCV 90 78 - 100 fL    MCH 29.2 26.5 - 33.0 pg    MCHC 32.5 31.5 - 36.5 g/dL    RDW 13.3 10.0 - 15.0 %    Platelet Count 475 (H) 150 - 450 10e3/uL    % Neutrophils 60 %    % Lymphocytes 29 %    % Monocytes 7 %    % Eosinophils 3 %    % Basophils 1 %    % Immature Granulocytes 1 %    Absolute Neutrophils 6.3 1.6 - 8.3 10e3/uL    Absolute Lymphocytes 3.0 0.8 - 5.3 10e3/uL    Absolute Monocytes 0.7 0.0 - 1.3 10e3/uL    Absolute Eosinophils 0.4 0.0 - 0.7 10e3/uL    Absolute Basophils 0.1 0.0 - 0.2 10e3/uL    Absolute Immature Granulocytes 0.1 (H) <=0.0 10e3/uL   CBC with platelets and differential     Status: Abnormal    Narrative    The following orders were created for panel order CBC with platelets and differential.  Procedure                               Abnormality         Status                     ---------                               -----------         ------                     CBC with platelets and d...[[id number]]  Abnormal            Final result                 Please view results for these tests on the individual orders.           ASSESSMENT/PLAN:      ICD-10-CM    1. Postpartum complication  O90.89     chills, dyspnea on exertion, hx of postpartum hemmorrhage, hct/hgb stable  ?Retained poc/endometritis, ? etiology of dyspnea -pulmonory vs cardiac issues -to ER   2. Dyspnea on exertion  R06.00     Hgb/hct  stable 11.2/34.5 ,hgb/hct on  9/16/21 9.1/26.9   3. Chills (without fever)  R68.83 CBC with platelets and differential     CBC with platelets and differential    normal WBC    4. History of postpartum hemorrhage  Z87.59 CBC with platelets and differential     CBC with platelets and  differential   5. Dysuria  R30.0 UA Macro with Reflex to Micro and Culture - lab collect     UA Macro with Reflex to Micro and Culture - lab collect     Urine Microscopic    UA within normal limits              Reviewed medication instructions and side effects. Follow up if experiences side effects.     I reviewed supportive care, otc meds to use if needed, expected course, and signs of concern.  Follow up as needed or if she does not improve within  1-2 days or if worsens in any way.  Reviewed red flag symptoms and is to go to the ER if experiences any of these.     The use of Dragon/O&P Proation services may have been used to construct the content in this note; any grammatical or spelling errors are non-intentional. Please contact the author of this note directly if you are in need of any clarification.        On the day of the encounter, time spend on chart review, patient visit, review of testing, documentation and discussion with other providers was 30  minutes        Patient Instructions     To [facility] for your shortness of breath pain with deep inspiration and history of chills and lower abdominal pain and a  mild increase in your postpartum bleeding-which would be concerning for possible endometritis  Trice Cervantes MD

## 2021-09-24 NOTE — PATIENT INSTRUCTIONS
To [facility] for your shortness of breath pain with deep inspiration and history of chills and lower abdominal pain and a  mild increase in your postpartum bleeding-which would be concerning for possible endometritis  Trice Cervantes MD

## 2021-10-10 ENCOUNTER — HEALTH MAINTENANCE LETTER (OUTPATIENT)
Age: 41
End: 2021-10-10

## 2022-05-21 ENCOUNTER — HEALTH MAINTENANCE LETTER (OUTPATIENT)
Age: 42
End: 2022-05-21

## 2022-09-18 ENCOUNTER — HEALTH MAINTENANCE LETTER (OUTPATIENT)
Age: 42
End: 2022-09-18

## 2023-06-04 ENCOUNTER — HEALTH MAINTENANCE LETTER (OUTPATIENT)
Age: 43
End: 2023-06-04

## 2024-02-25 ENCOUNTER — HEALTH MAINTENANCE LETTER (OUTPATIENT)
Age: 44
End: 2024-02-25

## 2024-03-19 ENCOUNTER — TRANSFERRED RECORDS (OUTPATIENT)
Dept: HEALTH INFORMATION MANAGEMENT | Facility: CLINIC | Age: 44
End: 2024-03-19

## 2024-04-24 ENCOUNTER — TRANSFERRED RECORDS (OUTPATIENT)
Dept: HEALTH INFORMATION MANAGEMENT | Facility: CLINIC | Age: 44
End: 2024-04-24

## 2024-04-25 ENCOUNTER — MEDICAL CORRESPONDENCE (OUTPATIENT)
Dept: HEALTH INFORMATION MANAGEMENT | Facility: CLINIC | Age: 44
End: 2024-04-25

## 2024-04-25 ENCOUNTER — TELEPHONE (OUTPATIENT)
Dept: CARDIOLOGY | Facility: CLINIC | Age: 44
End: 2024-04-25

## 2024-06-04 ENCOUNTER — HOSPITAL ENCOUNTER (OUTPATIENT)
Dept: CARDIOLOGY | Facility: CLINIC | Age: 44
Discharge: HOME | End: 2024-06-04
Payer: COMMERCIAL

## 2024-06-04 ENCOUNTER — OFFICE VISIT (OUTPATIENT)
Dept: PEDIATRIC CARDIOLOGY | Facility: CLINIC | Age: 44
End: 2024-06-04

## 2024-06-04 DIAGNOSIS — O35.BXX2 ANOMALY OF HEART OF FETUS AFFECTING PREGNANCY, ANTEPARTUM, FETUS 2 OF MULTIPLE GESTATION: ICD-10-CM

## 2024-06-04 DIAGNOSIS — O30.039 MONOCHORIONIC DIAMNIOTIC TWIN GESTATION: ICD-10-CM

## 2024-06-04 DIAGNOSIS — O35.BXX1 ANOMALY OF HEART OF FETUS AFFECTING PREGNANCY, ANTEPARTUM, FETUS 1 OF MULTIPLE GESTATION: ICD-10-CM

## 2024-06-04 PROCEDURE — 76827 ECHO EXAM OF FETAL HEART: CPT | Mod: 26 | Performed by: PEDIATRICS

## 2024-06-04 PROCEDURE — 93325 DOPPLER ECHO COLOR FLOW MAPG: CPT | Mod: 26 | Performed by: PEDIATRICS

## 2024-06-04 PROCEDURE — 76827 ECHO EXAM OF FETAL HEART: CPT

## 2024-06-04 PROCEDURE — 99204 OFFICE O/P NEW MOD 45 MIN: CPT | Mod: 25 | Performed by: PEDIATRICS

## 2024-06-04 PROCEDURE — 76825 ECHO EXAM OF FETAL HEART: CPT | Mod: 26 | Performed by: PEDIATRICS

## 2024-06-04 PROCEDURE — 93325 DOPPLER ECHO COLOR FLOW MAPG: CPT

## 2024-06-04 NOTE — LETTER
2024      RE: Soledad Velez  [address]  [facility] [address]     Dear Colleague,    Thank you for the opportunity to participate in the care of your patient, Soledad Velez, at the [facility] at [facility]. Please see a copy of my visit note below.    [facility]   Heart Center Fetal Consult Note    Patient:  Soledad Velez MRN:  [id number]   YOB: 1980 Age:  43 year old   Date of Visit:  2024 PCP:  No Ref-Primary, Physician     Dear Doctor:    I had the pleasure of seeing Soledad Velez at the [facility] on 2024 in fetal cardiology consultation for fetal echocardiogram. She presented today accompanied by her . As you know, she is a 43 year old  at Unknown who presented for fetal echocardiogram today because of pregnancy by IVF and Mono-Di twins.     I performed and interpreted the fetal echocardiogram today, which demonstrated:    Fetus 1  Normal fetal cardiac anatomy. Normal fetal intracardiac connections. Normal right and left ventricular size and function. Normal right ventricular Tei index at 0.16. Normal left ventricular Tei index at 0.25. Fetal heart rate is regular at 153 bpm. No hydrops.    Fetus 2  Normal fetal cardiac anatomy. Normal fetal intracardiac connections. Normal right and left ventricular size and function. Normal right ventricular Tei index at 0.25. Normal left ventricular Tei index at 0.24. Fetal heart rate is regular at 148 bpm. No hydrops.       I reviewed today's findings with Soledad Velez and her partner. She is aware that the study was within normal limits with no major cardiac abnormalities. She is aware of the general limitations of fetal echocardiography. No additional fetal echocardiograms are recommended. No  cardiac follow-up is required.       Thank you for allowing me to participate in Soledad's  care. Please do not hesitate to contact me with questions or concerns.    This visit was separate from the performance and interpretation of the ultrasound. The majority of the time (>50%) was spent in counseling and coordination of care. I spent approximately 20 minutes in face-to-face time reviewing the above considerations.    Wayne Roa M.D.  Pediatric Cardiology  [facility] [address] [facility], [place] [address]  Phone [phone]  Fax [phone]      Please do not hesitate to contact me if you have any questions/concerns.     Sincerely,       Ryan Roa MD

## 2024-06-04 NOTE — PROGRESS NOTES
[facility]   Heart Center Fetal Consult Note    Patient:  Soledad Velez MRN:  [id number]   YOB: 1980 Age:  43 year old   Date of Visit:  2024 PCP:  No Ref-Primary, Physician     Dear Doctor:    I had the pleasure of seeing Soledad Velez at the [facility] on 2024 in fetal cardiology consultation for fetal echocardiogram. She presented today accompanied by her . As you know, she is a 43 year old  at Unknown who presented for fetal echocardiogram today because of pregnancy by IVF and Mono-Di twins.     I performed and interpreted the fetal echocardiogram today, which demonstrated:    Fetus 1  Normal fetal cardiac anatomy. Normal fetal intracardiac connections. Normal right and left ventricular size and function. Normal right ventricular Tei index at 0.16. Normal left ventricular Tei index at 0.25. Fetal heart rate is regular at 153 bpm. No hydrops.    Fetus 2  Normal fetal cardiac anatomy. Normal fetal intracardiac connections. Normal right and left ventricular size and function. Normal right ventricular Tei index at 0.25. Normal left ventricular Tei index at 0.24. Fetal heart rate is regular at 148 bpm. No hydrops.       I reviewed today's findings with Soledad Velez and her partner. She is aware that the study was within normal limits with no major cardiac abnormalities. She is aware of the general limitations of fetal echocardiography. No additional fetal echocardiograms are recommended. No  cardiac follow-up is required.       Thank you for allowing me to participate in Soledad's care. Please do not hesitate to contact me with questions or concerns.    This visit was separate from the performance and interpretation of the ultrasound. The majority of the time (>50%) was spent in counseling and coordination of care. I spent approximately 20 minutes in face-to-face time reviewing the above considerations.    Wayne Roa  M.D.  Pediatric Cardiology  [facility]  [address] Office [address] 4th floor, [facility] [address]  Phone [phone]  Fax [phone]

## 2024-07-02 NOTE — ED NOTES
Pt reported to EMTALA nurse in the lobby that she felt better after throwing up and is going home, did not sign declination form.    [FreeTextEntry1] : 49 y/o F with PMHx of Asthma, Lyme disease, GERD, Hypothyroidism and Cervical Ca s/p LEEP chronic left foot pain syndrome. Presents today with c/o of frequent palpitations and some 'chest sensation' denies CP, dyspnea or claudication.

## 2024-07-14 ENCOUNTER — HEALTH MAINTENANCE LETTER (OUTPATIENT)
Age: 44
End: 2024-07-14

## 2024-08-07 ENCOUNTER — TELEPHONE (OUTPATIENT)
Dept: OBGYN | Facility: CLINIC | Age: 44
End: 2024-08-07
Payer: COMMERCIAL

## 2024-08-07 NOTE — CONFIDENTIAL NOTE
Spoke with patient to attempt to schedule LISA. Patient was questioning if providers would do breech extraction. Writer waiting for response from Clinic manager and providers as to how/if we could accommodate this.     Will call back once answer is given.

## 2024-08-07 NOTE — TELEPHONE ENCOUNTER
[facility] Call Center    Phone Message    May a detailed message be left on voicemail: yes     Reason for Call:  Pt is 32 weeks with twins looking to transfer care to us. Pt is wanting to know does [facility] perform breech extraction please call pt     Action Taken: Message routed to:  Other: [facility]    Travel Screening: Not Applicable

## 2024-08-08 NOTE — CONFIDENTIAL NOTE
LVM for patient inform patient of information given by Dr. Cali.    -Most of our providers can do breech extractions, if it is a good option at the time of delivery.   -We cannot guarantee who will be your delivering provider, it is just whoever is on call.   -We cannot guarantee any specific birthing plans, especially without seeing patient in clinic to evaluate her individual medical history and her individual situation.     We can offer a new patient (non-OB) appointment to consult about birthing options with Dr. Leger on 8/19, without initiating a full LISA.    Patient should schedule this appointment, with a LISA for later that week (First Provider visit 8/23, Nurse OBI 8/20) so if she decides to transfer to [facility] we can accommodate her in a reasonable time frame. Appointments are on hold with Dr. Leger for this patient.

## 2025-07-19 ENCOUNTER — HEALTH MAINTENANCE LETTER (OUTPATIENT)
Age: 45
End: 2025-07-19